# Patient Record
Sex: MALE | Race: BLACK OR AFRICAN AMERICAN | NOT HISPANIC OR LATINO | Employment: OTHER | URBAN - METROPOLITAN AREA
[De-identification: names, ages, dates, MRNs, and addresses within clinical notes are randomized per-mention and may not be internally consistent; named-entity substitution may affect disease eponyms.]

---

## 2024-01-09 ENCOUNTER — OFFICE VISIT (OUTPATIENT)
Dept: FAMILY MEDICINE CLINIC | Facility: CLINIC | Age: 79
End: 2024-01-09
Payer: COMMERCIAL

## 2024-01-09 VITALS
WEIGHT: 214.4 LBS | SYSTOLIC BLOOD PRESSURE: 126 MMHG | BODY MASS INDEX: 31.76 KG/M2 | DIASTOLIC BLOOD PRESSURE: 64 MMHG | HEIGHT: 69 IN | HEART RATE: 66 BPM | OXYGEN SATURATION: 94 %

## 2024-01-09 DIAGNOSIS — I10 HYPERTENSION, UNSPECIFIED TYPE: ICD-10-CM

## 2024-01-09 DIAGNOSIS — N50.89 SCROTAL SWELLING: ICD-10-CM

## 2024-01-09 DIAGNOSIS — N43.3 HYDROCELE, UNSPECIFIED HYDROCELE TYPE: Primary | ICD-10-CM

## 2024-01-09 PROCEDURE — 1160F RVW MEDS BY RX/DR IN RCRD: CPT | Performed by: INTERNAL MEDICINE

## 2024-01-09 PROCEDURE — 99204 OFFICE O/P NEW MOD 45 MIN: CPT | Performed by: INTERNAL MEDICINE

## 2024-01-09 PROCEDURE — 1159F MED LIST DOCD IN RCRD: CPT | Performed by: INTERNAL MEDICINE

## 2024-01-10 NOTE — PROGRESS NOTES
Chief Complaint   Patient presents with    testicle issue     Pt states testicles are collecting fluid.          HPI:  Slick Gomez is a 78 y.o. male who presents today for [facility] care, recently moved from [place].  History of fluid accumulation in testicles, recently drained a few years back.  Nonpainful.  Worsening over the last month.    ROS:  Constitutional: no fevers, night sweats or unexplained weight loss  Eyes: no vision changes  ENT: no runny nose, ear pain, sore throat  Cardio: no chest pain, palpitations  Pulm: no shortness of breath, wheezing, or cough  GI: no abdominal pain or changes in bowel movements  : no difficulty urinating  MSK: no difficulty ambulating, no joint pain  Neuro: no weakness, dizziness or headache  Psych: no trouble sleeping  Endo: no change in appetite      History reviewed. No pertinent past medical history.   History reviewed. No pertinent family history.   Social History     Socioeconomic History    Marital status:    Tobacco Use    Smoking status: Former     Types: Cigarettes     Quit date:      Years since quittin.0    Smokeless tobacco: Never   Vaping Use    Vaping Use: Never used   Substance and Sexual Activity    Alcohol use: Never    Drug use: Never    Sexual activity: Defer      Allergies   Allergen Reactions    Penicillins Hives        There is no immunization history on file for this patient.     PE:  Vitals:    24 1446   BP: 126/64   Pulse: 66   SpO2: 94%      Body mass index is 31.66 kg/m².    Gen Appearance: NAD  HEENT: Normocephalic, PERRLA, no thyromegaly, trache midline  Heart: RRR, normal S1 and S2, no murmur  Lungs: CTA b/l, no wheezing, no crackles  Abdomen: Soft, non-tender, non-distended, no guarding and BSx4  MSK: Moves all extremities well, normal gait, no peripheral edema  Pulses: Palpable and equal b/l  Lymph nodes: No palpable lymphadenopathy   Neuro: No focal deficits      No current outpatient medications on file.     No  current facility-administered medications for this visit.      Suspect hydrocele, refer to urology to establish care in [place].  Currently on blood pressure medication but he does not know the name of it.  Will check his prescriptions when he returns home.  Recommend follow-up in 1 month for annual physical and blood work.    Counseling was given to patient and family for the following topics: impressions, risks and benefits of treatment options, and importance of treatment compliance . Total time of the encounter was 45 minutes and 26 minutes was spent face to face counseling.    Diagnoses and all orders for this visit:    1. Hydrocele, unspecified hydrocele type (Primary)    2. Scrotal swelling    3. Hypertension, unspecified type         Return in about 4 weeks (around 2/6/2024) for Medicare Wellness.     Dictated Utilizing Dragon Dictation    Please note that portions of this note were completed with a voice recognition program.    Part of this note may be an electronic transcription/translation of spoken language to printed text using the Dragon Dictation System.

## 2024-01-12 ENCOUNTER — TELEPHONE (OUTPATIENT)
Dept: FAMILY MEDICINE CLINIC | Facility: CLINIC | Age: 79
End: 2024-01-12
Payer: COMMERCIAL

## 2024-01-12 DIAGNOSIS — N50.89 SCROTAL SWELLING: ICD-10-CM

## 2024-01-12 DIAGNOSIS — N43.3 HYDROCELE, UNSPECIFIED HYDROCELE TYPE: Primary | ICD-10-CM

## 2024-01-12 NOTE — TELEPHONE ENCOUNTER
Caller: Slick Gomez    Relationship: Self    Best call back number: [phone]     What is the medical concern/diagnosis: FLUID IN TESTICLE    What specialty or service is being requested: UROLOGIST        Any additional details: PATIENT HAS DECIDED TO GO ON AND GET SURGERY INSTEAD OF DRAINING THE FLUID AGAIN. NEED A REFERRAL TO A UROLOGIST. CALLBACK TO DISCUSS

## 2024-01-15 DIAGNOSIS — N50.89 SCROTAL SWELLING: ICD-10-CM

## 2024-01-15 DIAGNOSIS — N43.3 HYDROCELE, UNSPECIFIED HYDROCELE TYPE: Primary | ICD-10-CM

## 2024-01-30 ENCOUNTER — TELEPHONE (OUTPATIENT)
Dept: FAMILY MEDICINE CLINIC | Facility: CLINIC | Age: 79
End: 2024-01-30
Payer: COMMERCIAL

## 2024-01-30 NOTE — TELEPHONE ENCOUNTER
Clayton Vogel, DO [address] Clinical Pool; Cici Navarro RegSched RepJust now (12:54 PM)       Urology declined to see him unless they had US result. So will need to do US first then follow up with urology. We could see if another urology office will see/schedule him without the US if he doesn't want to wait?

## 2024-01-30 NOTE — TELEPHONE ENCOUNTER
Left message for Slick Gomez  to return my call.    Hub may relay message and document      Urology declined to see him unless they had US result. So will need to do US first then follow up with urology. We could see if another urology office will see/schedule him without the US if he doesn't want to wait?

## 2024-01-30 NOTE — TELEPHONE ENCOUNTER
Caller: TIM CUEVAS    Relationship: Spouse    Best call back number: [phone]     What is the best time to reach you: ANYTIME    Who are you requesting to speak with (clinical staff, provider,  specific staff member): PCP/MA    Do you know the name of the person who called: TIM    What was the call regarding: WIFE OF PATIENT STATED THE PATIENT WANTS TO GET SURGERY NOW INSTEAD OF JUST TAKING WATER OUT. CALLBACK TO DISCUSS    Is it okay if the provider responds through MyChart: CALLBACK TODAY PLEASE

## 2024-01-30 NOTE — TELEPHONE ENCOUNTER
Name: TIM CUEVAS    Relationship: Spouse    Best Callback Number:  [phone] (home)       HUB PROVIDED THE RELAY MESSAGE FROM THE OFFICE   PATIENT HAS FURTHER QUESTIONS AND WOULD LIKE A CALL BACK AT THE FOLLOWING PHONE NUMBER [phone]    ADDITIONAL INFORMATION: PATIENT'S WIFE TIM IS REQUESTING A CALL BACK   SHE DOES NOT KNOW WHAT US  MEANS     PLEASE CALL

## 2024-01-31 NOTE — TELEPHONE ENCOUNTER
Miesha Sawyer RegSched Rep11 minutes ago (10:45 AM)     JERICA  Caller: TIM CUEVAS     Relationship: Spouse     Best call back number: [phone]      What is the best time to reach you: ANYTIME     Who are you requesting to speak with (clinical staff, provider,  specific staff member): DR. ZELAYA     Do you know the name of the person who called: TIM     What was the call regarding: PATIENT WIFE IS CALLING TO REQUEST A CALL BACK FROM DR. ZELAYA TO DISCUSS CONCERNS FOR THE  SURGERY ABOUT THE PROSTATE        Is it okay if the provider responds through MyChart: NO PLEASE CALL ASAP

## 2024-01-31 NOTE — TELEPHONE ENCOUNTER
Unable to reach Slick Gomez by phone or leave message.     Hub may relay message and document.     US=Ultrasound      Per Dr Vogel will need to complete ultrasound first before urology will see patient       Please get any further questions or concerns in more detail

## 2024-01-31 NOTE — TELEPHONE ENCOUNTER
Unable to reach Slick Gomez by phone or leave message.    Hub may relay message and document.    US=Ultrasound     Per Dr Vogel will need to complete ultrasound first before urology will see patient

## 2024-02-01 NOTE — TELEPHONE ENCOUNTER
Name: TIM CUEVAS      Relationship: Spouse      Best Callback Number: [phone]      HUB PROVIDED THE RELAY MESSAGE FROM THE OFFICE      PATIENT: VOICED UNDERSTANDING AND HAS NO FURTHER QUESTIONS AT THIS TIME    ADDITIONAL INFORMATION:

## 2024-02-03 NOTE — TELEPHONE ENCOUNTER
Pt's wife called back regarding surgery to remove fluid. Relayed message and let her know he has been scheduled for a US on 2/20 at 3:30pm. Appt reminder has been placed in mail.

## 2024-02-13 ENCOUNTER — OFFICE VISIT (OUTPATIENT)
Dept: FAMILY MEDICINE CLINIC | Facility: CLINIC | Age: 79
End: 2024-02-13
Payer: COMMERCIAL

## 2024-02-13 VITALS
HEIGHT: 69 IN | OXYGEN SATURATION: 96 % | WEIGHT: 220 LBS | BODY MASS INDEX: 32.58 KG/M2 | DIASTOLIC BLOOD PRESSURE: 78 MMHG | HEART RATE: 59 BPM | SYSTOLIC BLOOD PRESSURE: 140 MMHG

## 2024-02-13 DIAGNOSIS — R41.3 MEMORY LOSS: ICD-10-CM

## 2024-02-13 DIAGNOSIS — N43.3 HYDROCELE, UNSPECIFIED HYDROCELE TYPE: ICD-10-CM

## 2024-02-13 DIAGNOSIS — I10 PRIMARY HYPERTENSION: ICD-10-CM

## 2024-02-13 DIAGNOSIS — Z00.00 MEDICARE ANNUAL WELLNESS VISIT, SUBSEQUENT: Primary | ICD-10-CM

## 2024-02-13 PROCEDURE — 1160F RVW MEDS BY RX/DR IN RCRD: CPT | Performed by: INTERNAL MEDICINE

## 2024-02-13 PROCEDURE — 1170F FXNL STATUS ASSESSED: CPT | Performed by: INTERNAL MEDICINE

## 2024-02-13 PROCEDURE — G0439 PPPS, SUBSEQ VISIT: HCPCS | Performed by: INTERNAL MEDICINE

## 2024-02-13 PROCEDURE — 99397 PER PM REEVAL EST PAT 65+ YR: CPT | Performed by: INTERNAL MEDICINE

## 2024-02-13 PROCEDURE — 1159F MED LIST DOCD IN RCRD: CPT | Performed by: INTERNAL MEDICINE

## 2024-02-13 RX ORDER — TRIAMCINOLONE ACETONIDE 1 MG/G
CREAM TOPICAL
COMMUNITY
Start: 2023-10-25

## 2024-02-20 ENCOUNTER — HOSPITAL ENCOUNTER (OUTPATIENT)
Dept: ULTRASOUND IMAGING | Facility: HOSPITAL | Age: 79
Discharge: HOME | End: 2024-02-20
Admitting: INTERNAL MEDICINE
Payer: COMMERCIAL

## 2024-02-20 DIAGNOSIS — N50.89 SCROTAL SWELLING: ICD-10-CM

## 2024-02-20 DIAGNOSIS — N43.3 HYDROCELE, UNSPECIFIED HYDROCELE TYPE: ICD-10-CM

## 2024-02-20 PROCEDURE — 76870 US EXAM SCROTUM: CPT

## 2024-02-21 DIAGNOSIS — N43.3 HYDROCELE, UNSPECIFIED HYDROCELE TYPE: Primary | ICD-10-CM

## 2024-03-19 ENCOUNTER — TELEPHONE (OUTPATIENT)
Dept: FAMILY MEDICINE CLINIC | Facility: CLINIC | Age: 79
End: 2024-03-19
Payer: COMMERCIAL

## 2024-03-19 ENCOUNTER — PATIENT ROUNDING (BHMG ONLY) (OUTPATIENT)
Dept: UROLOGY | Facility: CLINIC | Age: 79
End: 2024-03-19
Payer: COMMERCIAL

## 2024-03-19 ENCOUNTER — OFFICE VISIT (OUTPATIENT)
Dept: UROLOGY | Facility: CLINIC | Age: 79
End: 2024-03-19
Payer: COMMERCIAL

## 2024-03-19 VITALS — HEIGHT: 69 IN | WEIGHT: 216 LBS | OXYGEN SATURATION: 96 % | BODY MASS INDEX: 31.99 KG/M2 | HEART RATE: 60 BPM

## 2024-03-19 DIAGNOSIS — N43.3 HYDROCELE, UNSPECIFIED HYDROCELE TYPE: Primary | ICD-10-CM

## 2024-03-19 RX ORDER — SCOLOPAMINE TRANSDERMAL SYSTEM 1 MG/1
1 PATCH, EXTENDED RELEASE TRANSDERMAL CONTINUOUS
OUTPATIENT
Start: 2024-03-19 | End: 2024-03-22

## 2024-03-19 RX ORDER — ACETAMINOPHEN 500 MG
1000 TABLET ORAL ONCE
OUTPATIENT
Start: 2024-03-19 | End: 2024-03-19

## 2024-03-19 RX ORDER — GABAPENTIN 100 MG/1
600 CAPSULE ORAL ONCE
OUTPATIENT
Start: 2024-03-19 | End: 2024-03-19

## 2024-03-19 NOTE — PROGRESS NOTES
Office Visit [facility]      Patient Name: Slick Gomez  : 1945   MRN: [id number]     Chief Complaint:    Chief Complaint   Patient presents with    Hydrocele       Referring Provider: Clayton Vogel, *    History of Present Illness: Slick Gomez is a 78 y.o. male who presents to Urology today for evaluation of bilateral scrotal swelling.  Patient is accompanied by his family who aids in his history.  They report longstanding history of hydrocele.  He reports previously seeing a urologist and undergoing aspiration in 10/2023.  He has had quick recurrence of fluid.  Today he reports increasing size, associated bother with size and discomfort.  He denies baseline lower urinary tract symptoms.  Denies dysuria or hematuria.      Subjective      Review of System: Review of Systems   Genitourinary:  Positive for scrotal swelling. Negative for decreased urine volume, difficulty urinating, dysuria, enuresis, flank pain, frequency, hematuria and urgency.      I have reviewed the ROS documented by my clinical staff, updated appropriately and I agree. Christian Mendez MD    Past Medical History: History reviewed. No pertinent past medical history.    Past Surgical History: History reviewed. No pertinent surgical history.    Family History: History reviewed. No pertinent family history.    Social History:   Social History     Socioeconomic History    Marital status:    Tobacco Use    Smoking status: Former     Current packs/day: 0.00     Types: Cigarettes     Quit date:      Years since quittin.2     Passive exposure: Past    Smokeless tobacco: Never   Vaping Use    Vaping status: Never Used   Substance and Sexual Activity    Alcohol use: Never    Drug use: Never    Sexual activity: Defer       Medications:     Current Outpatient Medications:     acetaminophen (TYLENOL) 325 MG tablet, Take 2 tablets by mouth Every 6 (Six) Hours As Needed for Mild Pain., Disp: , Rfl:     aspirin (aspirin  EC) 81 MG EC tablet, Take 1 tablet by mouth Daily., Disp: , Rfl:     Calcium Carb-Cholecalciferol (Oyster Shell Calcium/D) 500-10 MG-MCG tablet tablet, Take  by mouth Daily., Disp: , Rfl:     desmopressin (DDAVP) 0.2 MG tablet, Take 1 tablet by mouth Daily., Disp: , Rfl:     donepezil (ARICEPT) 10 MG tablet, Take 1 tablet by mouth Every Night., Disp: , Rfl:     escitalopram (LEXAPRO) 10 MG tablet, Take 1 tablet by mouth Daily., Disp: , Rfl:     finasteride (PROSCAR) 5 MG tablet, Take 1 tablet by mouth Daily., Disp: , Rfl:     lisinopril (PRINIVIL,ZESTRIL) 40 MG tablet, Take 1 tablet by mouth Daily., Disp: , Rfl:     memantine (NAMENDA) 5 MG tablet, Take 1 tablet by mouth 2 (Two) Times a Day., Disp: , Rfl:     NIFEdipine XL (PROCARDIA XL) 60 MG 24 hr tablet, Take 1 tablet by mouth Daily., Disp: , Rfl:     Omega-3 Fatty Acids (fish oil) 1000 MG capsule capsule, Take  by mouth Daily With Breakfast., Disp: , Rfl:     risperiDONE (risperDAL) 0.25 MG tablet, Take 1 tablet by mouth 2 (Two) Times a Day., Disp: , Rfl:     tamsulosin (FLOMAX) 0.4 MG capsule 24 hr capsule, Take 1 capsule by mouth Daily., Disp: , Rfl:     triamcinolone (KENALOG) 0.1 % cream, APPLY TO ARMS TWICE A DAY AS NEEDED, Disp: , Rfl:     Allergies:   Allergies   Allergen Reactions    Penicillins Hives       IPSS Questionnaire (AUA-7):  Over the past month…    1)  Incomplete Emptying  How often have you had a sensation of not emptying your bladder?  5 - Almost always   2)  Frequency  How often have you had to urinate less than every two hours? 5 - Almost always   3)  Intermittency  How often have you found you stopped and started again several times when you urinated?  0 - Not at all   4) Urgency  How often have you found it difficult to postpone urination?  0 - Not at all   5) Weak Stream  How often have you had a weak urinary stream?  5 - Almost always   6) Straining  How often have you had to push or strain to begin urination?  0 - Not at all   7)  "Nocturia  How many times did you typically get up at night to urinate?  2 - 2 times   Total Score:  17       Quality of life due to urinary symptoms:  If you were to spend the rest of your life with your urinary condition the way it is now, how would you feel about that? 5-Unhappy   Urine Leakage (Incontinence) 0-No Leakage         Objective     Physical Exam:   Vital Signs:   Vitals:    03/19/24 0920   Pulse: 60   SpO2: 96%   Weight: 98 kg (216 lb)   Height: 175.3 cm (69.02\")   PainSc: 0-No pain     Body mass index is 31.88 kg/m².     Physical Exam  Vitals and nursing note reviewed.   Constitutional:       Appearance: Normal appearance.   HENT:      Head: Normocephalic and atraumatic.   Cardiovascular:      Comments: Well perfused  Pulmonary:      Effort: Pulmonary effort is normal.   Abdominal:      General: Abdomen is flat.      Palpations: Abdomen is soft.   Musculoskeletal:         General: Normal range of motion.   Skin:     General: Skin is warm and dry.   Neurological:      General: No focal deficit present.      Mental Status: He is alert and oriented to person, place, and time. Mental status is at baseline.   Psychiatric:         Mood and Affect: Mood normal.         Behavior: Behavior normal.         Thought Content: Thought content normal.         Judgment: Judgment normal.         Genitourinary  Penis: circumcised penis, glans normal, no penile discharge.  No rashes/lesions.    Testes: Large bilateral hydrocele remainder of scrotal contents normal. No hernia appreciated.      Labs:   Brief Urine Lab Results       None                 No results found for: \"GLUCOSE\", \"CALCIUM\", \"NA\", \"K\", \"CO2\", \"CL\", \"BUN\", \"CREATININE\", \"EGFRIFAFRI\", \"EGFRIFNONA\", \"BCR\", \"ANIONGAP\"    No results found for: \"WBC\", \"HGB\", \"HCT\", \"MCV\", \"PLT\"    Images:   US Scrotum & Testicles    Result Date: 2/21/2024  Impression: 1.Large bilateral hydroceles. Electronically Signed: Navneet Mazariegos MD  2/21/2024 4:06 PM EST  Workstation " ID: [id number]      Measures:   Tobacco:   Slick Gomez  reports that he quit smoking about 12 years ago. His smoking use included cigarettes. He has been exposed to tobacco smoke. He has never used smokeless tobacco. I have educated him on the risk of diseases from using tobacco products.   Assessment / Plan      Assessment/Plan:   78 y.o. male is here today for evaluation of large bilateral hydrocele.  He has had scrotal ultrasound which confirms diagnosis.  No additional pathology noted.  He has had prior aspiration with urologist in [facility] in 10/2023.  He reports return of fluid, increasing symptoms in size.  He reports significant bother.  Today we have discussed management including surveillance versus intervention.  Given associated bother the patient reports that he desires intervention.  We have discussed the risks and benefits of hydrocelectomy including infection, bleeding, swelling and bruising, recurrence of hydrocele.  He is understanding.  Given his bother symptoms he would like to proceed.  We will coordinate and schedule pending patient in OR availability.    Diagnoses and all orders for this visit:    1. Hydrocele, unspecified hydrocele type (Primary)  -     acetaminophen (TYLENOL) tablet 1,000 mg  -     gabapentin (NEURONTIN) capsule 600 mg  -     scopolamine patch 1 mg/72 hr  -     Case Request; Standing  -     ceFAZolin (ANCEF) 2,000 mg in sodium chloride 0.9 % 100 mL IVPB  -     Case Request    Other orders  -     Follow Anesthesia Guidelines / Protocol; Future  -     Provide NPO Instructions to Patient; Future  -     Provide Hydration Instructions to Patient; Future  -     Provide Patient With Enhanced Recovery Booklet(s) or Handout; Future  -     Provide Chlorhexidine Skin Prep Wipes and Instructions; Future  -     Obtain Informed Consent; Future  -     Nursing to Order Blood Glucose on all Inpatients >18 years Old with not BMP Ordered; Future  -     Nursing to Place Order for  HgbA1c on Adult Patients >18 Years Old (HgbA1c Within One Month of Admission Acceptable); Future  -     Follow Anesthesia Guidelines / Protocol; Standing  -     Provide Patient With Enhanced Recovery Booklet(s) OR Handout; Standing  -     Verify NPO Status; Standing  -     Verify the Time Patient Completed Gatorade / G2; Standing         I spent approximately 45 minutes providing clinical care for this patient; including review of patient's chart and provider documentation, face to face time spent with patient in examination room (obtaining history, performing physical exam, discussing diagnosis and management options), placing orders, and completing patient documentation.     Christian Mendez MD  [facility] [place]

## 2024-03-19 NOTE — PROGRESS NOTES
A [employer] message has been sent to the patient for PATIENT ROUNDING with [facility].

## 2024-03-19 NOTE — TELEPHONE ENCOUNTER
Caller: KODY ROBERTS    Relationship to patient: Other    Best call back number: [phone]     Patient is needing: KODY ROBERTS WITH [facility] CALLED TO ADVISE THE THE PATIENT'S [employer] MEDICARE COVERAGE IS REQUIRING A REFERRAL TO BE SENT TO THEM FOR AUTHORIZATION.    SHE STATED THAT IN ORDER FOR HIS VISIT TODAY TO BE COVERED, DR ZELAYA WILL NEED TO SUBMIT A REFERRAL TO THE PATIENT'S INSURANCE.    PLEASE ADVISE PATIENT WHEN THIS HAS BEEN FACILITATED OR IF THERE ARE ANY QUESTIONS/CONCERNS, PLEASE REACH OUT TO KODY ROBERTS.

## 2024-03-19 NOTE — TELEPHONE ENCOUNTER
CALLED AND SPOKE WITH A  LADY AND SHE STATED THAT KODY ROBERTS IS ON THE PHONE BUT SHE WILL BE GIVING ME A CALLBACK

## 2024-03-21 ENCOUNTER — TELEPHONE (OUTPATIENT)
Dept: FAMILY MEDICINE CLINIC | Facility: CLINIC | Age: 79
End: 2024-03-21
Payer: COMMERCIAL

## 2024-03-21 NOTE — TELEPHONE ENCOUNTER
Caller: CHARLEY CUEVAS    Relationship: WIFE    Best call back number: [phone]     What was the call regarding: CALLING IN ABOUT PATIENT, STATING HIS BLOOD NEEDS TO BE CHANGED. UNABLE TO PROVIDE MORE DETAILS. PLEASE ADVISE.     Is it okay if the provider responds through MyChart: NO

## 2024-03-21 NOTE — TELEPHONE ENCOUNTER
CALLED AND SPOKE WITH PTS WIFE REGARDING THE PHONE CALL AND SHE STATED SHE DOESN'T KNOW WHAT SHE MEANT BY HIS BLOOD NEEDS TO BE CHANGED SHE SEEMED TO BE CONFUSED SO SHE STATED THAT SHE WILL CALL BACK WHEN SHE CAN REMEMBER.

## 2024-04-15 ENCOUNTER — PRE-ADMISSION TESTING (OUTPATIENT)
Dept: PREADMISSION TESTING | Facility: HOSPITAL | Age: 79
End: 2024-04-15
Payer: COMMERCIAL

## 2024-04-15 VITALS — BODY MASS INDEX: 30.71 KG/M2 | HEIGHT: 70 IN | WEIGHT: 214.51 LBS

## 2024-04-15 LAB
DEPRECATED RDW RBC AUTO: 49.5 FL (ref 37–54)
ERYTHROCYTE [DISTWIDTH] IN BLOOD BY AUTOMATED COUNT: 15.1 % (ref 12.3–15.4)
GLUCOSE SERPL-MCNC: 102 MG/DL (ref 65–99)
HBA1C MFR BLD: 4.9 % (ref 4.8–5.6)
HCT VFR BLD AUTO: 38.8 % (ref 37.5–51)
HGB BLD-MCNC: 12.4 G/DL (ref 13–17.7)
MCH RBC QN AUTO: 28.6 PG (ref 26.6–33)
MCHC RBC AUTO-ENTMCNC: 32 G/DL (ref 31.5–35.7)
MCV RBC AUTO: 89.6 FL (ref 79–97)
PLATELET # BLD AUTO: 204 10*3/MM3 (ref 140–450)
PMV BLD AUTO: 9.9 FL (ref 6–12)
POTASSIUM SERPL-SCNC: 5 MMOL/L (ref 3.5–5.2)
RBC # BLD AUTO: 4.33 10*6/MM3 (ref 4.14–5.8)
WBC NRBC COR # BLD AUTO: 8.74 10*3/MM3 (ref 3.4–10.8)

## 2024-04-15 PROCEDURE — 82947 ASSAY GLUCOSE BLOOD QUANT: CPT

## 2024-04-15 PROCEDURE — 36415 COLL VENOUS BLD VENIPUNCTURE: CPT

## 2024-04-15 PROCEDURE — 83036 HEMOGLOBIN GLYCOSYLATED A1C: CPT

## 2024-04-15 PROCEDURE — 85027 COMPLETE CBC AUTOMATED: CPT

## 2024-04-15 PROCEDURE — 84132 ASSAY OF SERUM POTASSIUM: CPT

## 2024-04-15 PROCEDURE — 93005 ELECTROCARDIOGRAM TRACING: CPT

## 2024-04-15 NOTE — PAT
Per Anesthesia Request, patient instructed not to take their ACE/ARB medications on the AM of surgery.    Patient to apply Chlorhexadine wipes  to surgical area (as instructed) the night before procedure and the AM of procedure. Wipes provided.    Patient instructed to remove all jewelry for procedure.  Patient was instructed that if unable to remove jewelry especially rings to request assistance from a jeweler. Explained that if the piece of jewelry could not be removed before arrival to preop that it will be cut off.  Reinforced with patient that all jewelry must be removed for safety reasons that taping a ring was not an option.  Patient verbalized understanding.    Patient instructed to drink 20 ounces of Gatorade or Gatorlyte (if diabetic) and it needs to be completed 1 hour (for Main OR patients) or 2 hours (scheduled  section & [facility] patients) before given arrival time for procedure (NO RED Gatorade and NO Gatorade Zero).    Patient verbalized understanding.

## 2024-04-18 LAB
QT INTERVAL: 398 MS
QTC INTERVAL: 420 MS

## 2024-04-21 ENCOUNTER — ANESTHESIA EVENT (OUTPATIENT)
Dept: PERIOP | Facility: HOSPITAL | Age: 79
End: 2024-04-21
Payer: COMMERCIAL

## 2024-04-21 RX ORDER — SODIUM CHLORIDE 0.9 % (FLUSH) 0.9 %
10 SYRINGE (ML) INJECTION EVERY 12 HOURS SCHEDULED
Status: CANCELLED | OUTPATIENT
Start: 2024-04-21

## 2024-04-21 RX ORDER — SODIUM CHLORIDE 9 MG/ML
40 INJECTION, SOLUTION INTRAVENOUS AS NEEDED
Status: CANCELLED | OUTPATIENT
Start: 2024-04-21

## 2024-04-21 RX ORDER — SODIUM CHLORIDE 0.9 % (FLUSH) 0.9 %
10 SYRINGE (ML) INJECTION AS NEEDED
Status: CANCELLED | OUTPATIENT
Start: 2024-04-21

## 2024-04-21 RX ORDER — FAMOTIDINE 10 MG/ML
20 INJECTION, SOLUTION INTRAVENOUS ONCE
Status: CANCELLED | OUTPATIENT
Start: 2024-04-21 | End: 2024-04-21

## 2024-04-22 ENCOUNTER — HOSPITAL ENCOUNTER (OUTPATIENT)
Facility: HOSPITAL | Age: 79
Setting detail: HOSPITAL OUTPATIENT SURGERY
Discharge: HOME | End: 2024-04-22
Attending: UROLOGY | Admitting: UROLOGY
Payer: COMMERCIAL

## 2024-04-22 ENCOUNTER — ANESTHESIA (OUTPATIENT)
Dept: PERIOP | Facility: HOSPITAL | Age: 79
End: 2024-04-22
Payer: COMMERCIAL

## 2024-04-22 VITALS
DIASTOLIC BLOOD PRESSURE: 71 MMHG | HEART RATE: 68 BPM | BODY MASS INDEX: 31.77 KG/M2 | OXYGEN SATURATION: 95 % | RESPIRATION RATE: 17 BRPM | WEIGHT: 214.51 LBS | TEMPERATURE: 97.5 F | SYSTOLIC BLOOD PRESSURE: 165 MMHG | HEIGHT: 69 IN

## 2024-04-22 DIAGNOSIS — N43.3 HYDROCELE, UNSPECIFIED HYDROCELE TYPE: ICD-10-CM

## 2024-04-22 LAB — GLUCOSE BLDC GLUCOMTR-MCNC: 81 MG/DL (ref 70–130)

## 2024-04-22 PROCEDURE — 25810000003 LACTATED RINGERS PER 1000 ML: Performed by: ANESTHESIOLOGY

## 2024-04-22 PROCEDURE — 25010000002 SUGAMMADEX 200 MG/2ML SOLUTION: Performed by: NURSE ANESTHETIST, CERTIFIED REGISTERED

## 2024-04-22 PROCEDURE — 25010000002 FENTANYL CITRATE (PF) 100 MCG/2ML SOLUTION: Performed by: NURSE ANESTHETIST, CERTIFIED REGISTERED

## 2024-04-22 PROCEDURE — 88302 TISSUE EXAM BY PATHOLOGIST: CPT | Performed by: UROLOGY

## 2024-04-22 PROCEDURE — 54512 EXCISE LESION TESTIS: CPT | Performed by: UROLOGY

## 2024-04-22 PROCEDURE — 55041 REMOVAL OF HYDROCELES: CPT | Performed by: UROLOGY

## 2024-04-22 PROCEDURE — 25010000002 CEFAZOLIN PER 500 MG: Performed by: UROLOGY

## 2024-04-22 PROCEDURE — 54830 REMOVE EPIDIDYMIS LESION: CPT | Performed by: UROLOGY

## 2024-04-22 PROCEDURE — 25010000002 LIDOCAINE 1 % SOLUTION: Performed by: UROLOGY

## 2024-04-22 PROCEDURE — 25010000002 ONDANSETRON PER 1 MG: Performed by: NURSE ANESTHETIST, CERTIFIED REGISTERED

## 2024-04-22 PROCEDURE — 25010000002 PROPOFOL 10 MG/ML EMULSION: Performed by: NURSE ANESTHETIST, CERTIFIED REGISTERED

## 2024-04-22 PROCEDURE — 82948 REAGENT STRIP/BLOOD GLUCOSE: CPT

## 2024-04-22 RX ORDER — LIDOCAINE HYDROCHLORIDE 10 MG/ML
0.5 INJECTION, SOLUTION EPIDURAL; INFILTRATION; INTRACAUDAL; PERINEURAL ONCE AS NEEDED
Status: COMPLETED | OUTPATIENT
Start: 2024-04-22 | End: 2024-04-22

## 2024-04-22 RX ORDER — ETOMIDATE 2 MG/ML
INJECTION INTRAVENOUS AS NEEDED
Status: DISCONTINUED | OUTPATIENT
Start: 2024-04-22 | End: 2024-04-22 | Stop reason: SURG

## 2024-04-22 RX ORDER — LIDOCAINE HYDROCHLORIDE 10 MG/ML
INJECTION, SOLUTION INFILTRATION; PERINEURAL AS NEEDED
Status: DISCONTINUED | OUTPATIENT
Start: 2024-04-22 | End: 2024-04-22 | Stop reason: HOSPADM

## 2024-04-22 RX ORDER — ROCURONIUM BROMIDE 10 MG/ML
INJECTION, SOLUTION INTRAVENOUS AS NEEDED
Status: DISCONTINUED | OUTPATIENT
Start: 2024-04-22 | End: 2024-04-22 | Stop reason: SURG

## 2024-04-22 RX ORDER — FENTANYL CITRATE 50 UG/ML
INJECTION, SOLUTION INTRAMUSCULAR; INTRAVENOUS AS NEEDED
Status: DISCONTINUED | OUTPATIENT
Start: 2024-04-22 | End: 2024-04-22

## 2024-04-22 RX ORDER — ONDANSETRON 2 MG/ML
INJECTION INTRAMUSCULAR; INTRAVENOUS AS NEEDED
Status: DISCONTINUED | OUTPATIENT
Start: 2024-04-22 | End: 2024-04-22 | Stop reason: SURG

## 2024-04-22 RX ORDER — MIDAZOLAM HYDROCHLORIDE 1 MG/ML
0.5 INJECTION INTRAMUSCULAR; INTRAVENOUS
Status: DISCONTINUED | OUTPATIENT
Start: 2024-04-22 | End: 2024-04-22 | Stop reason: HOSPADM

## 2024-04-22 RX ORDER — SODIUM CHLORIDE, SODIUM LACTATE, POTASSIUM CHLORIDE, CALCIUM CHLORIDE 600; 310; 30; 20 MG/100ML; MG/100ML; MG/100ML; MG/100ML
9 INJECTION, SOLUTION INTRAVENOUS CONTINUOUS
Status: DISCONTINUED | OUTPATIENT
Start: 2024-04-22 | End: 2024-04-22 | Stop reason: HOSPADM

## 2024-04-22 RX ORDER — DROPERIDOL 2.5 MG/ML
0.62 INJECTION, SOLUTION INTRAMUSCULAR; INTRAVENOUS ONCE AS NEEDED
Status: DISCONTINUED | OUTPATIENT
Start: 2024-04-22 | End: 2024-04-22 | Stop reason: HOSPADM

## 2024-04-22 RX ORDER — SCOLOPAMINE TRANSDERMAL SYSTEM 1 MG/1
1 PATCH, EXTENDED RELEASE TRANSDERMAL CONTINUOUS
Status: DISCONTINUED | OUTPATIENT
Start: 2024-04-22 | End: 2024-04-22 | Stop reason: HOSPADM

## 2024-04-22 RX ORDER — FENTANYL CITRATE 50 UG/ML
50 INJECTION, SOLUTION INTRAMUSCULAR; INTRAVENOUS
Status: DISCONTINUED | OUTPATIENT
Start: 2024-04-22 | End: 2024-04-22 | Stop reason: HOSPADM

## 2024-04-22 RX ORDER — ACETAMINOPHEN 500 MG
1000 TABLET ORAL ONCE
Status: COMPLETED | OUTPATIENT
Start: 2024-04-22 | End: 2024-04-22

## 2024-04-22 RX ORDER — PROPOFOL 10 MG/ML
VIAL (ML) INTRAVENOUS AS NEEDED
Status: DISCONTINUED | OUTPATIENT
Start: 2024-04-22 | End: 2024-04-22 | Stop reason: SURG

## 2024-04-22 RX ORDER — MAGNESIUM HYDROXIDE 1200 MG/15ML
LIQUID ORAL AS NEEDED
Status: DISCONTINUED | OUTPATIENT
Start: 2024-04-22 | End: 2024-04-22 | Stop reason: HOSPADM

## 2024-04-22 RX ORDER — FAMOTIDINE 20 MG/1
20 TABLET, FILM COATED ORAL ONCE
Status: COMPLETED | OUTPATIENT
Start: 2024-04-22 | End: 2024-04-22

## 2024-04-22 RX ORDER — GABAPENTIN 300 MG/1
600 CAPSULE ORAL ONCE
Status: COMPLETED | OUTPATIENT
Start: 2024-04-22 | End: 2024-04-22

## 2024-04-22 RX ORDER — LIDOCAINE HYDROCHLORIDE 10 MG/ML
INJECTION, SOLUTION EPIDURAL; INFILTRATION; INTRACAUDAL; PERINEURAL AS NEEDED
Status: DISCONTINUED | OUTPATIENT
Start: 2024-04-22 | End: 2024-04-22 | Stop reason: SURG

## 2024-04-22 RX ADMIN — ACETAMINOPHEN 1000 MG: 500 TABLET ORAL at 07:04

## 2024-04-22 RX ADMIN — LIDOCAINE HYDROCHLORIDE 50 MG: 10 INJECTION, SOLUTION EPIDURAL; INFILTRATION; INTRACAUDAL; PERINEURAL at 07:41

## 2024-04-22 RX ADMIN — SODIUM CHLORIDE 2 G: 900 INJECTION INTRAVENOUS at 07:28

## 2024-04-22 RX ADMIN — ETOMIDATE 10 MG: 2 INJECTION, SOLUTION INTRAVENOUS at 07:42

## 2024-04-22 RX ADMIN — ROCURONIUM BROMIDE 50 MG: 10 INJECTION INTRAVENOUS at 07:43

## 2024-04-22 RX ADMIN — PROPOFOL 50 MG: 10 INJECTION, EMULSION INTRAVENOUS at 07:42

## 2024-04-22 RX ADMIN — FENTANYL CITRATE 50 MCG: 50 INJECTION, SOLUTION INTRAMUSCULAR; INTRAVENOUS at 07:39

## 2024-04-22 RX ADMIN — SODIUM CHLORIDE, POTASSIUM CHLORIDE, SODIUM LACTATE AND CALCIUM CHLORIDE 9 ML/HR: 600; 310; 30; 20 INJECTION, SOLUTION INTRAVENOUS at 07:05

## 2024-04-22 RX ADMIN — SUGAMMADEX 200 MG: 100 INJECTION, SOLUTION INTRAVENOUS at 08:58

## 2024-04-22 RX ADMIN — ONDANSETRON 4 MG: 2 INJECTION INTRAMUSCULAR; INTRAVENOUS at 08:08

## 2024-04-22 RX ADMIN — PROPOFOL 100 MG: 10 INJECTION, EMULSION INTRAVENOUS at 07:47

## 2024-04-22 RX ADMIN — GABAPENTIN 600 MG: 300 CAPSULE ORAL at 07:04

## 2024-04-22 RX ADMIN — LIDOCAINE HYDROCHLORIDE 0.5 ML: 10 INJECTION, SOLUTION EPIDURAL; INFILTRATION; INTRACAUDAL; PERINEURAL at 07:04

## 2024-04-22 RX ADMIN — FAMOTIDINE 20 MG: 20 TABLET, FILM COATED ORAL at 07:05

## 2024-04-22 RX ADMIN — FENTANYL CITRATE 50 MCG: 50 INJECTION, SOLUTION INTRAMUSCULAR; INTRAVENOUS at 08:08

## 2024-04-22 RX ADMIN — SUGAMMADEX 100 MG: 100 INJECTION, SOLUTION INTRAVENOUS at 09:02

## 2024-04-22 NOTE — ANESTHESIA PREPROCEDURE EVALUATION
Anesthesia Evaluation     Patient summary reviewed and Nursing notes reviewed   no history of anesthetic complications:   NPO Solid Status: > 8 hours  NPO Liquid Status: > 2 hours           Airway   Mallampati: II  TM distance: >3 FB  Neck ROM: full  Dental - normal exam     Pulmonary    (+) a smoker Former,  Cardiovascular     ECG reviewed    (+) hypertension  (-) dysrhythmias, angina, orthopnea, cardiac stents      Neuro/Psych  (+) dementia  (-) seizures, CVA    ROS Comment: H/o remote brain aneurysm  GI/Hepatic/Renal/Endo      Musculoskeletal     Abdominal    Substance History      OB/GYN          Other   arthritis,                       Anesthesia Plan    ASA 3     general     intravenous induction     Anesthetic plan, risks, benefits, and alternatives have been provided, discussed and informed consent has been obtained with: patient.    Plan discussed with CRNA.        CODE STATUS:

## 2024-04-22 NOTE — OP NOTE
HYDROCELECTOMY  Procedure Report    Patient Name:  Slick Gomez  YOB: 1945    Date of Surgery:  4/22/2024     Indications: 78-year-old male with bilateral large hydrocele.  The patient reports significant bother associated.  He would like to proceed with bilateral hydrocelectomy.  Risk benefits and alternatives to procedure, bilateral procedure were discussed and he elects to proceed    Pre-op Diagnosis:   Hydrocele, unspecified hydrocele type [N43.3]       Post-Op Diagnosis Codes:     * Hydrocele, unspecified hydrocele type [N43.3]    Procedure/CPT® Codes: 02640, 34399, 84408 (MODIFIER 50)      Procedure(s)  LEFT HYDROCELECTOMY  EXCISION OF LEFT APPENDIX TESTIS  EXCISION OF LEFT APPENDIX EPIDIDYMIS  RIGHT HYDROCELECTOMY  EXCISION OF RIGHT APPENDIX TESTIS   EXCISION OF RIGHT APPENDIX EPIDIDYMIS    MODIFIER 50- BILATERAL/INDEPENDENT PROCEDURE       Staff:  Surgeon(s):  Christian Mendez MD         Anesthesia: General    Estimated Blood Loss: minimal    Implants:    Nothing was implanted during the procedure    Specimen:          Specimens       ID Source Type Tests Collected By Collected At Frozen?    A Testis, Left Tissue TISSUE PATHOLOGY EXAM   Christian Mendez MD 4/22/24 0810     Description: left hydrocele sac    B Testis, Right Tissue TISSUE PATHOLOGY EXAM   Christian Mendez MD 4/22/24 0811     Description: Right hydrocele sac            Findings:   Left hydrocelectomy, approximately 300 mL of fluid removed.   Removal of left testicular appendage, epididymal appendage.  Right hydrocelectomy, approximately 175 mL of fluid removed.   Removal of right testicular appendage, epididymal appendage.    Complications: None immediate    Description of Procedure:     The patient was identified in the preoperative holding area where informed consent was reviewed and signed. He was transported the operating room per anesthesia and placed supine on the operating table.      Smooth endotracheal intubation  was performed without issue after administration of general anesthesia. The patient was kept in the supine position and genitals were prepped and draped in the usual sterile fashion. A brief timeout was performed identifying the correct patient procedure and laterality. Perioperative antibiotics were administered. All pressure points were padded.      4-6 cm incision was made along the left anterior hemiscrotum. Bovie cautery was used for hemostasis at the level of dartos tissue.  With the aid of the assistant, the dartos layers were carefully incised exposing the left hydrocele sac.  A hemostat clamp was used to dissected off the superficial layers overlying the hydrocele sac with the aid of the assistant of the Bovie cautery.  Eventually the entire hydrocele sac was cleared of the attached dartos tissue.      The testicle was identified within the hydrocele sac and avoided.  Bovie cautery was used to create a 1 cm incision, suction was used to remove hydrocele fluid.  Hydrocele fluid was straw color and clear yellow.  Next the hydrocele sac was incised longitudinally towards the spermatic cord and inferiorly towards the tail of the epididymis. The testicle was then delivered from within the hydrocele sac, the edges of the hydrocele sac were inverted.          Prominent appendix testis was identified on the superior pole of the testicle.  This was excised using 15 blade scalpel.  Hemostasis achieved.     Prominent appendix epididymis was identified on the epididymal head.  This was excised using 15 blade scalpel.  Hemostasis achieved.        Next a the hydrocele sac was excised with the Bovie cautery, taking care to avoid injury to the epididymis and spermatic cord itself.  The edges of the excised hydrocele sac were then cauterized with the Bovie cautery.  Via Jeboulay technique, the opposing edges of the remaining hydrocele sac were then sutured together with a running horizontal mattress 3-0 Vicryl stitch.           The testicular lie was then confirmed in the appropriate position and the testicle was returned to the hemiscrotal space after irrigation with sterile saline.  4 mL of Tisseel was applied to the area within the scrotum.  Hemostasis was checked at the right hemiscrotum and small bleeding dartos vessels were cauterized with the Bovie.          The left hemiscrotal space was then closed in 2 separate layers with a running 3-0 Vicryl stitch.  The skin was then closed with a running horizontal mattress 4-0 chromic stitch.     Then turned our attention to the right side, bilateral side.    4-6 cm incision was made along the right anterior hemiscrotum. Bovie cautery was used for hemostasis at the level of dartos tissue.  With the aid of the assistant, the dartos layers were carefully incised exposing the right hydrocele sac.  A hemostat clamp was used to dissected off the superficial layers overlying the hydrocele sac with the aid of the assistant of the Bovie cautery.  Eventually the entire hydrocele sac was cleared of the attached dartos tissue.      The testicle was identified within the hydrocele sac and avoided.  Bovie cautery was used to create a 1 cm incision, suction was used to remove hydrocele fluid.  Hydrocele fluid was straw color and clear yellow.  Next the hydrocele sac was incised longitudinally towards the spermatic cord and inferiorly towards the tail of the epididymis. The testicle was then delivered from within the hydrocele sac, the edges of the hydrocele sac were inverted.          Prominent appendix testis was identified on the superior pole of the testicle.  This was excised using 15 blade scalpel.  Hemostasis achieved.     Prominent appendix epididymis was identified on the epididymal head.  This was excised using 15 blade scalpel.  Hemostasis achieved.        Next a the hydrocele sac was excised with the Bovie cautery, taking care to avoid injury to the epididymis and spermatic cord itself.   The edges of the excised hydrocele sac were then cauterized with the Bovie cautery.  Via Jeboulay technique, the opposing edges of the remaining hydrocele sac were then sutured together with a running horizontal mattress 3-0 Vicryl stitch.          The testicular lie was then confirmed in the appropriate position and the testicle was returned to the hemiscrotal space after irrigation with sterile saline.  4 mL of Tisseel was applied to the area within the scrotum.  Hemostasis was checked at the right hemiscrotum and small bleeding dartos vessels were cauterized with the Bovie.          The right hemiscrotal space was then closed in 2 separate layers with a running 3-0 Vicryl stitch.  The skin was then closed with a running horizontal mattress 4-0 chromic stitch.        The patient was awoken from general anesthesia and transported to the PACU in stable condition.          Disposition/Follow Up: Patient will be discharged remaining appropriate PACU anterior.  Patient will follow-up in 2 to 3 weeks for postoperative wound check.    Christian Mendez MD     Date: 4/22/2024  Time: 09:21 EDT

## 2024-04-22 NOTE — DISCHARGE INSTRUCTIONS
DISCHARGE INSTRUCTIONS - HYDROCELECTOMY    ER WARNINGS  When to call the [facility] at: [phone]   If you feel the need to urinate, but are unable to   Excessive drainage/bleeding from your incision   If your incision becomes red, warm, and/or you notice pus draining from the incision   Fever greater than 100.5F; excessive chills   Nausea/vomiting or inability to keep fluids down   Significant tenderness or swelling in the legs, chest, and/or shortness of breath   If it is a serious emergency, go directly to the Emergency Room or call 911, and then notify the [facility]    IF YOU HAVE ANY QUESTIONS, CALL THE [facility] at [phone]. IF YOU ARE CALLING AFTER HOURS, PLEASE CALL THE HOSPITAL MAINLINE TO TALK WITH THE ON-CALL DOCTOR    SCROTAL SUPPORT   For the next 2 weeks wear supportive tighter underwear or an athletic supporter to keep compression on the scrotum. This, and using ice packs on and off 20 minutes at a time for the next 72 hours will greatly reduce the swelling post-operatively.    Your scrotum may experience some bruising, but call if it is swelling more and turning completely purple. In the mean time you should apply pressure to the scrotum to stop the expanding hematoma (blood clot) if it starts.    Your scrotum and the testicle in question is expected to be somewhat swollen for the next 4-6 weeks.     PAIN CONTROL   Some pain and discomfort is common after surgery. Initially, your goal should be comfort, not complete pain relief.    If permitted by your overall medical condition, begin by taking over the counter pain and inflammation relievers:   o Tylenol (acetaminophen): 650mg every 6 hours   o Ibuprofen (Advil, Motrin): 600mg every 8 hours with food    o Only take narcotic medications if absolutely needed for comfort after the other medications have been taken   o Use of narcotic pain medications should be minimized due to the  potential for side effects and long term complications    ACTIVITY   Make no important decisions for 24 hours.    Do not drive or operate equipment for 24 hours or while taking pain medications.    No lifting more than 5 pounds for 2 weeks.    INCISION    Your incision is closed with absorbable suture, such that you do not need to have staples or stitches removed in the clinic.    You may shower tomorrow. Just do not scrub or brush over those areas until they are healed. Do not submerge the incision under water (for example in a pool, bathtub, etc.) until it is fully healed, usually about 3 weeks.   Call for any redness, secretions, or openings you may observe in your incision.     Dr. Mendez's Office [phone]      Dr. Mendez's Office will call you with follow-up

## 2024-04-22 NOTE — ANESTHESIA POSTPROCEDURE EVALUATION
Patient: Slick Gomez    Procedure Summary       Date: 04/22/24 Room / Location:  [facility] /  [facility]    Anesthesia Start: 0730 Anesthesia Stop:     Procedure: HYDROCELECTOMY (Bilateral: Scrotum) Diagnosis:       Hydrocele, unspecified hydrocele type      (Hydrocele, unspecified hydrocele type [N43.3])    Surgeons: Christian Mendez MD Provider: Gene Roman Jr., MD    Anesthesia Type: general ASA Status: 3            Anesthesia Type: general    Vitals  Vitals Value Taken Time   /80 04/22/24 0911   Temp     Pulse 68 04/22/24 0914   Resp     SpO2 93 % 04/22/24 0914   Vitals shown include unfiled device data.        Post Anesthesia Care and Evaluation    Patient location during evaluation: PACU  Patient participation: complete - patient participated  Pain management: adequate    Airway patency: patent  Anesthetic complications: No anesthetic complications  PONV Status: none  Cardiovascular status: hemodynamically stable and acceptable  Respiratory status: nonlabored ventilation, acceptable and face mask  Hydration status: acceptable

## 2024-04-22 NOTE — ANESTHESIA PROCEDURE NOTES
Airway  Urgency: elective    Date/Time: 4/22/2024 7:45 AM  Airway not difficult    General Information and Staff    Patient location during procedure: OR    Indications and Patient Condition  Indications for airway management: airway protection    Preoxygenated: yes  MILS not maintained throughout  Mask difficulty assessment: 3 - difficult mask (inadequate, unstable or two providers) +/- NMBA    Final Airway Details  Final airway type: endotracheal airway      Successful airway: ETT  Cuffed: yes   Successful intubation technique: video laryngoscopy  Facilitating devices/methods: intubating stylet  Endotracheal tube insertion site: oral  Blade: Montoya  Blade size: 4  ETT size (mm): 7.5  Cormack-Lehane Classification: grade I - full view of glottis  Placement verified by: chest auscultation and capnometry   Inital cuff pressure (cm H2O): 7  Measured from: lips  ETT/EBT  to lips (cm): 23  Number of attempts at approach: 1  Assessment: lips, teeth, and gum same as pre-op and atraumatic intubation    Additional Comments  Negative epigastric sounds, Breath sound equal bilaterally with symmetric chest rise and fall

## 2024-04-22 NOTE — H&P
Pre-Op H&P  Slick Gomez  [id number]  1945    Chief complaint: scrotal swelling    HPI:    Patient is a 78 y.o.male who presents with longstanding bilateral scrotal swelling. This has been an issue for several years- he reports an aspiration by a physician in [place] in 10/2023 with quick return. He is scheduled for bilateral hydrocelectomy. He takes 81mg ASA routinely, last dose was 2-3 days ago.     Review of Systems:  General ROS: negative for chills, fever or skin lesions.  Cardiovascular ROS: no chest pain or dyspnea on exertion  Respiratory ROS: no cough, shortness of breath, or wheezing    Allergies:   Allergies   Allergen Reactions    Penicillins Hives       Home Meds:    No current facility-administered medications on file prior to encounter.     Current Outpatient Medications on File Prior to Encounter   Medication Sig Dispense Refill    acetaminophen (TYLENOL) 325 MG tablet Take 2 tablets by mouth Every 6 (Six) Hours As Needed for Mild Pain.      aspirin (aspirin EC) 81 MG EC tablet Take 1 tablet by mouth Daily.      Calcium Carb-Cholecalciferol (Oyster Shell Calcium/D) 500-10 MG-MCG tablet tablet Take  by mouth Daily.      desmopressin (DDAVP) 0.2 MG tablet Take 1 tablet by mouth Daily.      donepezil (ARICEPT) 10 MG tablet Take 1 tablet by mouth Every Night.      escitalopram (LEXAPRO) 10 MG tablet Take 1 tablet by mouth Daily.      finasteride (PROSCAR) 5 MG tablet Take 1 tablet by mouth Daily.      lisinopril (PRINIVIL,ZESTRIL) 40 MG tablet Take 1 tablet by mouth Daily.      memantine (NAMENDA) 5 MG tablet Take 1 tablet by mouth 2 (Two) Times a Day.      NIFEdipine XL (PROCARDIA XL) 60 MG 24 hr tablet Take 1 tablet by mouth Daily.      Omega-3 Fatty Acids (fish oil) 1000 MG capsule capsule Take  by mouth Daily With Breakfast.      risperiDONE (risperDAL) 0.25 MG tablet Take 1 tablet by mouth 2 (Two) Times a Day.      tamsulosin (FLOMAX) 0.4 MG capsule 24 hr capsule Take 1 capsule by  "mouth Daily.      triamcinolone (KENALOG) 0.1 % cream APPLY TO ARMS TWICE A DAY AS NEEDED         PMH:   Past Medical History:   Diagnosis Date    Aneurysm     head    Arthritis     Cataract     still present    Headache     Hypertension     Presence of dental bridge     x1- connecting 1 tooth-  dental bridge - bottom right side    Wears glasses      PSH:    Past Surgical History:   Procedure Laterality Date    CRANIOTOMY FOR ANEURYSM      HYDROCELECTOMY      drainage       Immunization History:  Influenza: UTD  Pneumococcal: UTD  Tetanus: UTD    Social History:   Tobacco:   Social History     Tobacco Use   Smoking Status Former    Current packs/day: 0.00    Types: Cigarettes    Quit date:     Years since quittin.3    Passive exposure: Past   Smokeless Tobacco Never      Alcohol:     Social History     Substance and Sexual Activity   Alcohol Use Never       Vitals:           /80 (BP Location: Right arm, Patient Position: Lying)   Pulse 62   Temp 97.8 °F (36.6 °C) (Temporal)   Resp 18   Ht 176.5 cm (69.49\")   Wt 97.3 kg (214 lb 8.1 oz)   SpO2 97%   BMI 31.23 kg/m²     Physical Exam:  General Appearance:    Alert, cooperative   Head:    Normocephalic, atraumatic   Lungs:     Clear to auscultation bilaterally, respirations unlabored    Heart:   Regular rate and rhythm, no murmur, rub or gallop    Abdomen:    Soft, nontender. No rigidity or guarding.    Breast Exam:    deferred   Genitalia:    deferred   Extremities:    No cyanosis    Skin:   Skin color, texture, turgor normal, no rashes or lesions   Neurologic:   Grossly intact   Results Review  LABS:  Lab Results   Component Value Date    WBC 8.74 04/15/2024    HGB 12.4 (L) 04/15/2024    HCT 38.8 04/15/2024    MCV 89.6 04/15/2024     04/15/2024    GLUCOSE 102 (H) 04/15/2024    K 5.0 04/15/2024   A1c: 4.90%    RADIOLOGY:  No radiology results for the last 3 days     I reviewed the patient's new imaging results and agree with the " interpretation.    Impression: bilateral hydrocele    Plan: bilateral hydrocelectomy     Jian Bansal PA-C   4/22/2024   06:58 EDT

## 2024-04-23 LAB
CYTO UR: NORMAL
LAB AP CASE REPORT: NORMAL
LAB AP CLINICAL INFORMATION: NORMAL
PATH REPORT.FINAL DX SPEC: NORMAL
PATH REPORT.GROSS SPEC: NORMAL

## 2024-05-04 ENCOUNTER — APPOINTMENT (OUTPATIENT)
Facility: HOSPITAL | Age: 79
End: 2024-05-04
Payer: COMMERCIAL

## 2024-05-04 ENCOUNTER — HOSPITAL ENCOUNTER (EMERGENCY)
Facility: HOSPITAL | Age: 79
Discharge: HOME | End: 2024-05-04
Attending: EMERGENCY MEDICINE
Payer: COMMERCIAL

## 2024-05-04 VITALS
HEART RATE: 66 BPM | OXYGEN SATURATION: 98 % | HEIGHT: 69 IN | WEIGHT: 214.51 LBS | DIASTOLIC BLOOD PRESSURE: 59 MMHG | RESPIRATION RATE: 18 BRPM | BODY MASS INDEX: 31.77 KG/M2 | SYSTOLIC BLOOD PRESSURE: 130 MMHG | TEMPERATURE: 98.3 F

## 2024-05-04 DIAGNOSIS — N43.3 HYDROCELE IN ADULT: ICD-10-CM

## 2024-05-04 DIAGNOSIS — T81.31XA WOUND DISRUPTION, POST-OP, SKIN, INITIAL ENCOUNTER: Primary | ICD-10-CM

## 2024-05-04 DIAGNOSIS — D64.89 ANEMIA DUE TO OTHER CAUSE, NOT CLASSIFIED: ICD-10-CM

## 2024-05-04 DIAGNOSIS — T81.49XA CELLULITIS, WOUND, POST-OPERATIVE: ICD-10-CM

## 2024-05-04 DIAGNOSIS — R79.89 ELEVATED SERUM CREATININE: ICD-10-CM

## 2024-05-04 LAB
ALBUMIN SERPL-MCNC: 3.2 G/DL (ref 3.5–5.2)
ALBUMIN/GLOB SERPL: 0.7 G/DL
ALP SERPL-CCNC: 84 U/L (ref 39–117)
ALT SERPL W P-5'-P-CCNC: 9 U/L (ref 1–41)
ANION GAP SERPL CALCULATED.3IONS-SCNC: 11.5 MMOL/L (ref 5–15)
AST SERPL-CCNC: 24 U/L (ref 1–40)
BACTERIA UR QL AUTO: NORMAL /HPF
BASOPHILS # BLD AUTO: 0.03 10*3/MM3 (ref 0–0.2)
BASOPHILS NFR BLD AUTO: 0.3 % (ref 0–1.5)
BILIRUB SERPL-MCNC: 0.3 MG/DL (ref 0–1.2)
BILIRUB UR QL STRIP: NEGATIVE
BUN SERPL-MCNC: 15 MG/DL (ref 8–23)
BUN/CREAT SERPL: 9.8 (ref 7–25)
CALCIUM SPEC-SCNC: 8.8 MG/DL (ref 8.6–10.5)
CHLORIDE SERPL-SCNC: 104 MMOL/L (ref 98–107)
CLARITY UR: CLEAR
CO2 SERPL-SCNC: 22.5 MMOL/L (ref 22–29)
COLOR UR: YELLOW
CREAT SERPL-MCNC: 1.53 MG/DL (ref 0.76–1.27)
D-LACTATE SERPL-SCNC: 2.1 MMOL/L (ref 0.5–2)
D-LACTATE SERPL-SCNC: 2.1 MMOL/L (ref 0.5–2)
DEPRECATED RDW RBC AUTO: 49.3 FL (ref 37–54)
EGFRCR SERPLBLD CKD-EPI 2021: 46.2 ML/MIN/1.73
EOSINOPHIL # BLD AUTO: 0.25 10*3/MM3 (ref 0–0.4)
EOSINOPHIL NFR BLD AUTO: 2.4 % (ref 0.3–6.2)
ERYTHROCYTE [DISTWIDTH] IN BLOOD BY AUTOMATED COUNT: 14.9 % (ref 12.3–15.4)
GLOBULIN UR ELPH-MCNC: 4.9 GM/DL
GLUCOSE SERPL-MCNC: 123 MG/DL (ref 65–99)
GLUCOSE UR STRIP-MCNC: NEGATIVE MG/DL
HCT VFR BLD AUTO: 29.5 % (ref 37.5–51)
HGB BLD-MCNC: 9.7 G/DL (ref 13–17.7)
HGB UR QL STRIP.AUTO: ABNORMAL
HYALINE CASTS UR QL AUTO: NORMAL /LPF
IMM GRANULOCYTES # BLD AUTO: 0.04 10*3/MM3 (ref 0–0.05)
IMM GRANULOCYTES NFR BLD AUTO: 0.4 % (ref 0–0.5)
INR PPP: 1.19 (ref 0.89–1.12)
KETONES UR QL STRIP: NEGATIVE
LEUKOCYTE ESTERASE UR QL STRIP.AUTO: NEGATIVE
LYMPHOCYTES # BLD AUTO: 1.68 10*3/MM3 (ref 0.7–3.1)
LYMPHOCYTES NFR BLD AUTO: 16.1 % (ref 19.6–45.3)
MCH RBC QN AUTO: 29.3 PG (ref 26.6–33)
MCHC RBC AUTO-ENTMCNC: 32.9 G/DL (ref 31.5–35.7)
MCV RBC AUTO: 89.1 FL (ref 79–97)
MONOCYTES # BLD AUTO: 0.83 10*3/MM3 (ref 0.1–0.9)
MONOCYTES NFR BLD AUTO: 7.9 % (ref 5–12)
NEUTROPHILS NFR BLD AUTO: 7.62 10*3/MM3 (ref 1.7–7)
NEUTROPHILS NFR BLD AUTO: 72.9 % (ref 42.7–76)
NITRITE UR QL STRIP: NEGATIVE
PH UR STRIP.AUTO: 7 [PH] (ref 5–8)
PLATELET # BLD AUTO: 345 10*3/MM3 (ref 140–450)
PMV BLD AUTO: 9.7 FL (ref 6–12)
POTASSIUM SERPL-SCNC: 4.4 MMOL/L (ref 3.5–5.2)
PROT SERPL-MCNC: 8.1 G/DL (ref 6–8.5)
PROT UR QL STRIP: NEGATIVE
PROTHROMBIN TIME: 15.7 SECONDS (ref 12.2–14.5)
RBC # BLD AUTO: 3.31 10*6/MM3 (ref 4.14–5.8)
RBC # UR STRIP: NORMAL /HPF
REF LAB TEST METHOD: NORMAL
SODIUM SERPL-SCNC: 138 MMOL/L (ref 136–145)
SP GR UR STRIP: 1.01 (ref 1–1.03)
SQUAMOUS #/AREA URNS HPF: NORMAL /HPF
TRANS CELLS #/AREA URNS HPF: NORMAL /HPF
UROBILINOGEN UR QL STRIP: ABNORMAL
WBC # UR STRIP: NORMAL /HPF
WBC NRBC COR # BLD AUTO: 10.45 10*3/MM3 (ref 3.4–10.8)

## 2024-05-04 PROCEDURE — 87147 CULTURE TYPE IMMUNOLOGIC: CPT | Performed by: PHYSICIAN ASSISTANT

## 2024-05-04 PROCEDURE — 87040 BLOOD CULTURE FOR BACTERIA: CPT | Performed by: PHYSICIAN ASSISTANT

## 2024-05-04 PROCEDURE — 87077 CULTURE AEROBIC IDENTIFY: CPT | Performed by: PHYSICIAN ASSISTANT

## 2024-05-04 PROCEDURE — 85025 COMPLETE CBC W/AUTO DIFF WBC: CPT | Performed by: PHYSICIAN ASSISTANT

## 2024-05-04 PROCEDURE — 85610 PROTHROMBIN TIME: CPT | Performed by: PHYSICIAN ASSISTANT

## 2024-05-04 PROCEDURE — 25010000002 VANCOMYCIN HCL IN NACL 2-0.9 GM/500ML-% SOLUTION: Performed by: PHYSICIAN ASSISTANT

## 2024-05-04 PROCEDURE — 87205 SMEAR GRAM STAIN: CPT | Performed by: PHYSICIAN ASSISTANT

## 2024-05-04 PROCEDURE — 80053 COMPREHEN METABOLIC PANEL: CPT | Performed by: PHYSICIAN ASSISTANT

## 2024-05-04 PROCEDURE — 25810000003 LACTATED RINGERS SOLUTION: Performed by: PHYSICIAN ASSISTANT

## 2024-05-04 PROCEDURE — 83605 ASSAY OF LACTIC ACID: CPT | Performed by: PHYSICIAN ASSISTANT

## 2024-05-04 PROCEDURE — 76870 US EXAM SCROTUM: CPT

## 2024-05-04 PROCEDURE — 87070 CULTURE OTHR SPECIMN AEROBIC: CPT | Performed by: PHYSICIAN ASSISTANT

## 2024-05-04 PROCEDURE — 25010000002 CEFEPIME PER 500 MG: Performed by: PHYSICIAN ASSISTANT

## 2024-05-04 PROCEDURE — 96366 THER/PROPH/DIAG IV INF ADDON: CPT

## 2024-05-04 PROCEDURE — 93976 VASCULAR STUDY: CPT

## 2024-05-04 PROCEDURE — 81001 URINALYSIS AUTO W/SCOPE: CPT | Performed by: PHYSICIAN ASSISTANT

## 2024-05-04 PROCEDURE — 87186 SC STD MICRODIL/AGAR DIL: CPT | Performed by: PHYSICIAN ASSISTANT

## 2024-05-04 PROCEDURE — 99284 EMERGENCY DEPT VISIT MOD MDM: CPT

## 2024-05-04 PROCEDURE — 36415 COLL VENOUS BLD VENIPUNCTURE: CPT

## 2024-05-04 PROCEDURE — 96365 THER/PROPH/DIAG IV INF INIT: CPT

## 2024-05-04 PROCEDURE — 96367 TX/PROPH/DG ADDL SEQ IV INF: CPT

## 2024-05-04 RX ORDER — VANCOMYCIN 2 GRAM/500 ML IN 0.9 % SODIUM CHLORIDE INTRAVENOUS
20 ONCE
Status: DISCONTINUED | OUTPATIENT
Start: 2024-05-04 | End: 2024-05-04

## 2024-05-04 RX ORDER — VANCOMYCIN 2 GRAM/500 ML IN 0.9 % SODIUM CHLORIDE INTRAVENOUS
20 ONCE
Status: COMPLETED | OUTPATIENT
Start: 2024-05-04 | End: 2024-05-04

## 2024-05-04 RX ORDER — LEVOFLOXACIN 500 MG/1
500 TABLET, FILM COATED ORAL DAILY
Qty: 7 TABLET | Refills: 0 | Status: SHIPPED | OUTPATIENT
Start: 2024-05-04 | End: 2024-05-11

## 2024-05-04 RX ORDER — LEVOFLOXACIN 500 MG/1
500 TABLET, FILM COATED ORAL DAILY
Qty: 7 TABLET | Refills: 0 | Status: SHIPPED | OUTPATIENT
Start: 2024-05-04 | End: 2024-05-04

## 2024-05-04 RX ADMIN — SODIUM CHLORIDE, POTASSIUM CHLORIDE, SODIUM LACTATE AND CALCIUM CHLORIDE 500 ML: 600; 310; 30; 20 INJECTION, SOLUTION INTRAVENOUS at 11:51

## 2024-05-04 RX ADMIN — Medication 2000 MG: at 13:14

## 2024-05-04 RX ADMIN — CEFEPIME 2 G: 2 INJECTION, POWDER, FOR SOLUTION INTRAVENOUS at 12:41

## 2024-05-04 RX ADMIN — SODIUM CHLORIDE, POTASSIUM CHLORIDE, SODIUM LACTATE AND CALCIUM CHLORIDE 500 ML: 600; 310; 30; 20 INJECTION, SOLUTION INTRAVENOUS at 13:14

## 2024-05-04 NOTE — DISCHARGE INSTRUCTIONS
You should be contacted by surgeon to have follow-up appointment on Monday next week, please keep this appointment and answer phone calls to schedule this.  If you do not hear from their office please call on Monday to get scheduled.  Keep wound clean and dry and wrapped with dressing until follow-up with surgeon.  You have been given prescription for Levaquin to take once a day for 7 days until completed.  Have a low threshold to return the emergency department for new, worsening, concerning symptoms new, worsening, concerning symptoms.

## 2024-05-04 NOTE — FSED PROVIDER NOTE
Subjective  History of Present Illness:    Patient is a 78-year-old male with a medical history of hypertension, dementia presents to the emergency department for postoperative wound check.  Patient had a bilateral hydrocelectomy on April 22 with [facility] surgeon .  Patient noticed drainage over the past 2 to 3 days prompting him to come the emergency department for evaluation.  Patient denies fever or systemic symptoms.  Patient denies heavy lifting or trauma to surgery site.  Patient is not taking antibiotics.  Patient denies urinary symptoms or pain.      Nurses Notes reviewed and agree, including vitals, allergies, social history and prior medical history.     REVIEW OF SYSTEMS: All systems reviewed and not pertinent unless noted.  Review of Systems   Constitutional:  Negative for chills and fever.   HENT:  Negative for ear pain, sore throat and trouble swallowing.    Eyes:  Negative for visual disturbance.   Respiratory:  Negative for cough and shortness of breath.    Cardiovascular:  Negative for chest pain, palpitations and leg swelling.   Gastrointestinal:  Negative for abdominal distention, abdominal pain, constipation, diarrhea, nausea and vomiting.   Endocrine: Negative for cold intolerance and heat intolerance.   Genitourinary:  Positive for scrotal swelling. Negative for difficulty urinating, dysuria, flank pain, frequency, genital sores, penile discharge, penile pain, penile swelling and urgency.        Drainage from left scrotal incision site   Musculoskeletal:  Negative for back pain, gait problem and neck pain.   Skin:  Negative for rash.   Allergic/Immunologic: Negative for immunocompromised state.   Neurological:  Negative for dizziness, syncope, weakness, light-headedness and numbness.   Hematological:  Does not bruise/bleed easily.   Psychiatric/Behavioral:  Negative for hallucinations. The patient is not nervous/anxious.    All other systems reviewed and are negative.      Past  "Medical History:   Diagnosis Date    Aneurysm     head    Arthritis     Cataract     still present    Headache     Hypertension     Presence of dental bridge     x1- connecting 1 tooth-  dental bridge - bottom right side    Wears glasses        Allergies:    Penicillins      Past Surgical History:   Procedure Laterality Date    CRANIOTOMY FOR ANEURYSM      HYDROCELECTOMY      drainage    HYDROCELECTOMY Bilateral 2024    Procedure: HYDROCELECTOMY BILATERAL;  Surgeon: Christian Mendez MD;  Location: [facility];  Service: Urology;  Laterality: Bilateral;         Social History     Socioeconomic History    Marital status:    Tobacco Use    Smoking status: Former     Current packs/day: 0.00     Types: Cigarettes     Quit date:      Years since quittin.3     Passive exposure: Past    Smokeless tobacco: Never   Vaping Use    Vaping status: Never Used   Substance and Sexual Activity    Alcohol use: Never    Drug use: Never    Sexual activity: Defer         No family history on file.    Objective  Physical Exam:  /68 (BP Location: Left arm, Patient Position: Sitting)   Pulse 77   Temp 98.3 °F (36.8 °C) (Oral)   Resp 18   Ht 176.5 cm (69.49\")   Wt 97.3 kg (214 lb 8.1 oz)   SpO2 95%   BMI 31.23 kg/m²      Physical Exam  Vitals and nursing note reviewed.   Constitutional:       General: He is not in acute distress.     Appearance: He is not toxic-appearing.   HENT:      Head: Normocephalic and atraumatic.      Right Ear: External ear normal.      Left Ear: External ear normal.      Nose: Nose normal.      Mouth/Throat:      Mouth: Mucous membranes are moist.   Eyes:      Extraocular Movements: Extraocular movements intact.      Conjunctiva/sclera: Conjunctivae normal.      Pupils: Pupils are equal, round, and reactive to light.   Cardiovascular:      Rate and Rhythm: Normal rate and regular rhythm.      Pulses: Normal pulses.      Heart sounds: Normal heart sounds. No murmur heard.  Pulmonary:    "   Effort: Pulmonary effort is normal.      Breath sounds: Normal breath sounds. No stridor. No wheezing or rhonchi.   Abdominal:      General: Bowel sounds are normal. There is no distension.      Palpations: Abdomen is soft.      Tenderness: There is no abdominal tenderness. There is no right CVA tenderness, left CVA tenderness, guarding or rebound.   Genitourinary:     Penis: Normal.       Comments: Chaperone Carmelo BECERRA. Edema bilaterally of scrotum with mild induration. No TTP. Wound dehiscence from left surgical site with mild drainage and odor.  Penile lesions or edema.  Musculoskeletal:         General: No deformity. Normal range of motion.      Cervical back: Normal range of motion and neck supple.      Right lower leg: No edema.      Left lower leg: No edema.   Skin:     General: Skin is warm and dry.      Capillary Refill: Capillary refill takes less than 2 seconds.      Findings: No erythema or rash.   Neurological:      General: No focal deficit present.      Mental Status: He is alert and oriented to person, place, and time.      Cranial Nerves: No cranial nerve deficit.      Sensory: No sensory deficit.      Coordination: Coordination normal.      Gait: Gait normal.   Psychiatric:         Mood and Affect: Mood normal.         Behavior: Behavior normal.         Procedures    ED Course:         Lab Results (last 24 hours)       ** No results found for the last 24 hours. **             No radiology results from the last 24 hrs       MDM     Amount and/or Complexity of Data Reviewed  Clinical lab tests: reviewed  Tests in the radiology section of CPT®: reviewed  Decide to obtain previous medical records or to obtain history from someone other than the patient: yes        Initial impression of presenting illness: Patient is 78-year-old male presents emergency department for postoperative wound check with draining from the left side of scrotum incision site.  Patient had surgery with Dr. Mendez on  4/22/2024.    DDX: includes but is not limited to: Infection, wound dehiscence, hematoma, others    Patient arrives afebrile, hemodynamic stable, nontoxic-appearing with vitals interpreted by myself.     Pertinent features from physical exam: There is wound dehiscence with foul odor and mild drainage left sided scrotal incision.  Edema and enlargement of the bilaterally scrotum with mild induration.  Patient denies TTP    Initial diagnostic plan: Workup includes CBC, CMP, lactic acid, blood cultures, wound culture, testicular ultrasound.    Results from initial plan were reviewed and interpreted by me revealing no evidence of leukocytosis or significant electrolyte abnormality.  Hemoglobin is down at 9.5 from 12, compared to 2 weeks ago.  Lactic acid is elevated at 2.1.  Patient given IV fluids as well as vancomycin and cefepime.  Urinalysis shows trace microscopic hematuria without evidence of UTI.  Ultrasound reveals:   1.Preserved blood flow within the bilateral testicles.  2.Large, complex/septated bilateral hydroceles. These could represent hematoceles, spermatoceles, or pyoceles. Please correlate clinically.  3.Bilateral scrotal skin thickening. Please correlate clinically for scrotal cellulitis.       Interventions / Re-evaluation: Discussed case with patient's surgeon, Dr. Mendez, and surgeon states that patient does not require admission at this time with benign vitals as it is common for patients of his age to have wound dehiscence after this procedure.  He would like patient on antibiotics and will have him follow-up on Monday for recheck.  Patient and family are agreeable with plan of care.  Wound is wrapped in the emergency department and patient is provided supplies for dressing change.  Patient will be sent home on Levaquin 500 mg once a day after discussing dosage with pharmacist.  Patient is instructed to follow-up with PCP for recheck of creatinine as we do not know baseline, as well as hemoglobin  levels.  Patient is given strict return precautions to the emergency department.    Medications - No data to display    Data interpreted: Nursing notes reviewed, vital signs reviewed.  Labs independently interpreted by me (CBC, CMP, lipase, UA, troponin, ABG, lactic acid, procalcitonin).  Imaging independently interpreted by me (x-ray, CT scan).  EKG independently interpreted by me.  O2 saturation:    Counseling: Discussed the results above with the patient regarding need for admission or discharge.  Patient understands and agrees plan of care.      -----  ED Disposition       None          Final diagnoses:   None     Your Follow-Up Providers    Follow-up information has not been specified.       Contact information for after-discharge care    Follow-up information has not been specified.          Your medication list        ASK your doctor about these medications        Instructions Last Dose Given Next Dose Due   acetaminophen 325 MG tablet  Commonly known as: TYLENOL      Take 2 tablets by mouth Every 6 (Six) Hours As Needed for Mild Pain.       aspirin EC 81 MG EC tablet  Generic drug: aspirin      Take 1 tablet by mouth Daily.       desmopressin 0.2 MG tablet  Commonly known as: DDAVP      Take 1 tablet by mouth Daily.       donepezil 10 MG tablet  Commonly known as: ARICEPT      Take 1 tablet by mouth Every Night.       escitalopram 10 MG tablet  Commonly known as: LEXAPRO      Take 1 tablet by mouth Daily.       finasteride 5 MG tablet  Commonly known as: PROSCAR      Take 1 tablet by mouth Daily.       fish oil 1000 MG capsule capsule      Take  by mouth Daily With Breakfast.       lisinopril 40 MG tablet  Commonly known as: PRINIVIL,ZESTRIL      Take 1 tablet by mouth Daily.       memantine 5 MG tablet  Commonly known as: NAMENDA      Take 1 tablet by mouth 2 (Two) Times a Day.       NIFEdipine XL 60 MG 24 hr tablet  Commonly known as: PROCARDIA XL      Take 1 tablet by mouth Daily.       Oyster Shell  Calcium/D 500-10 MG-MCG tablet tablet      Take  by mouth Daily.       risperiDONE 0.25 MG tablet  Commonly known as: risperDAL      Take 1 tablet by mouth 2 (Two) Times a Day.       tamsulosin 0.4 MG capsule 24 hr capsule  Commonly known as: FLOMAX      Take 1 capsule by mouth Daily.       triamcinolone 0.1 % cream  Commonly known as: KENALOG      APPLY TO ARMS TWICE A DAY AS NEEDED

## 2024-05-05 LAB
GRAM STN SPEC: NORMAL
GRAM STN SPEC: NORMAL

## 2024-05-06 ENCOUNTER — OFFICE VISIT (OUTPATIENT)
Dept: UROLOGY | Facility: CLINIC | Age: 79
End: 2024-05-06
Payer: COMMERCIAL

## 2024-05-06 VITALS — HEIGHT: 69 IN | BODY MASS INDEX: 31.7 KG/M2 | WEIGHT: 214 LBS

## 2024-05-06 DIAGNOSIS — N43.3 HYDROCELE, UNSPECIFIED HYDROCELE TYPE: Primary | ICD-10-CM

## 2024-05-07 ENCOUNTER — OFFICE VISIT (OUTPATIENT)
Dept: NEUROLOGY | Facility: CLINIC | Age: 79
End: 2024-05-07
Payer: COMMERCIAL

## 2024-05-07 VITALS
HEIGHT: 69 IN | SYSTOLIC BLOOD PRESSURE: 122 MMHG | BODY MASS INDEX: 30.87 KG/M2 | WEIGHT: 208.4 LBS | DIASTOLIC BLOOD PRESSURE: 50 MMHG | OXYGEN SATURATION: 97 % | HEART RATE: 80 BPM

## 2024-05-07 DIAGNOSIS — F03.90 DEMENTIA WITHOUT BEHAVIORAL DISTURBANCE: Primary | ICD-10-CM

## 2024-05-07 PROCEDURE — 99214 OFFICE O/P EST MOD 30 MIN: CPT | Performed by: NURSE PRACTITIONER

## 2024-05-07 RX ORDER — MEMANTINE HYDROCHLORIDE 10 MG/1
10 TABLET ORAL 2 TIMES DAILY
Qty: 60 TABLET | Refills: 11 | Status: SHIPPED | OUTPATIENT
Start: 2024-05-07

## 2024-05-07 RX ORDER — CETIRIZINE HYDROCHLORIDE 10 MG/1
10 TABLET ORAL DAILY
COMMUNITY

## 2024-05-08 LAB
BACTERIA SPEC AEROBE CULT: ABNORMAL
GRAM STN SPEC: ABNORMAL
GRAM STN SPEC: ABNORMAL

## 2024-05-08 RX ORDER — SULFAMETHOXAZOLE AND TRIMETHOPRIM 800; 160 MG/1; MG/1
2 TABLET ORAL 2 TIMES DAILY
Qty: 40 TABLET | Refills: 0 | Status: SHIPPED | OUTPATIENT
Start: 2024-05-08 | End: 2024-05-18

## 2024-05-10 LAB
BACTERIA SPEC AEROBE CULT: NORMAL
BACTERIA SPEC AEROBE CULT: NORMAL

## 2024-05-17 ENCOUNTER — OFFICE VISIT (OUTPATIENT)
Dept: UROLOGY | Facility: CLINIC | Age: 79
End: 2024-05-17
Payer: COMMERCIAL

## 2024-05-17 VITALS
WEIGHT: 208 LBS | SYSTOLIC BLOOD PRESSURE: 123 MMHG | OXYGEN SATURATION: 92 % | BODY MASS INDEX: 30.81 KG/M2 | HEART RATE: 97 BPM | HEIGHT: 69 IN | DIASTOLIC BLOOD PRESSURE: 67 MMHG

## 2024-05-17 DIAGNOSIS — N43.3 HYDROCELE, UNSPECIFIED HYDROCELE TYPE: Primary | ICD-10-CM

## 2024-05-17 DIAGNOSIS — T81.31XD POSTOPERATIVE WOUND DEHISCENCE, SUBSEQUENT ENCOUNTER: ICD-10-CM

## 2024-05-17 PROCEDURE — 99024 POSTOP FOLLOW-UP VISIT: CPT | Performed by: NURSE PRACTITIONER

## 2024-05-17 NOTE — PROGRESS NOTES
Follow Up Office Visit      Patient Name: Slick Gomez  : 1945   MRN: [id number]     Chief Complaint:    Chief Complaint   Patient presents with    Hydrocele, unspecified hydrocele type       Referring Provider: No ref. provider found    History of Present Illness: Slick Gomez is a 78 y.o. male who presents today for follow up of post-operative wound check. He is s/p Bilateral Hydrocelectomy on 24 with Dr. Mendez. He presents with his wife today who helps to provide some of the history. He was seen in office one week ago for incision site/wound check. At that time he was taking Levaquin 500mg and performing local wound care with gauze. Post-operative wound was discussed with Dr. Mendez at that time with plans to continue antibiotic and local wound care/no signs of infection at that time.     Today he is feeling well. He denies dysuria, scrotal pain, worsening scrotal swelling, fever or chills. He has completed course of Levaquin.   Subjective      Review of System: Review of Systems   Genitourinary:  Positive for scrotal swelling. Negative for dysuria and testicular pain.   All other systems reviewed and are negative.     I have reviewed the ROS documented by my clinical staff, I have updated appropriately and I agree. EDEN Plaza    I have reviewed and the following portions of the patient's history were updated as appropriate: past family history, past medical history, past social history, past surgical history and problem list.    Medications:     Current Outpatient Medications:     acetaminophen (TYLENOL) 325 MG tablet, Take 2 tablets by mouth Every 6 (Six) Hours As Needed for Mild Pain., Disp: , Rfl:     aspirin (aspirin EC) 81 MG EC tablet, Take 1 tablet by mouth Daily., Disp: , Rfl:     Calcium Carb-Cholecalciferol (Oyster Shell Calcium/D) 500-10 MG-MCG tablet tablet, Take  by mouth Daily., Disp: , Rfl:     cetirizine (zyrTEC) 10 MG tablet, Take 1 tablet by mouth  "Daily., Disp: , Rfl:     desmopressin (DDAVP) 0.2 MG tablet, Take 1 tablet by mouth Daily., Disp: , Rfl:     donepezil (ARICEPT) 10 MG tablet, Take 1 tablet by mouth Every Night., Disp: , Rfl:     escitalopram (LEXAPRO) 10 MG tablet, Take 1 tablet by mouth Daily., Disp: , Rfl:     finasteride (PROSCAR) 5 MG tablet, Take 1 tablet by mouth Daily., Disp: , Rfl:     memantine (Namenda) 10 MG tablet, Take 1 tablet by mouth 2 (Two) Times a Day., Disp: 60 tablet, Rfl: 11    NIFEdipine XL (PROCARDIA XL) 60 MG 24 hr tablet, Take 1 tablet by mouth Daily., Disp: , Rfl:     Omega-3 Fatty Acids (fish oil) 1000 MG capsule capsule, Take  by mouth 2 (Two) Times a Day With Meals., Disp: , Rfl:     risperiDONE (risperDAL) 0.25 MG tablet, Take 1 tablet by mouth 2 (Two) Times a Day., Disp: , Rfl:     tamsulosin (FLOMAX) 0.4 MG capsule 24 hr capsule, Take 1 capsule by mouth Daily., Disp: , Rfl:     ondansetron ODT (ZOFRAN-ODT) 4 MG disintegrating tablet, Take 1 tablet by mouth Every 6 (Six) Hours As Needed for Nausea or Vomiting., Disp: 30 tablet, Rfl: 0  No current facility-administered medications for this visit.    Allergies:   Allergies   Allergen Reactions    Penicillins Hives     Has tolerated Cefazolin in the past             Objective     Physical Exam:   Vital Signs:   Vitals:    05/17/24 1033   BP: 123/67   Pulse: 97   SpO2: 92%   Weight: 94.3 kg (208 lb)   Height: 175.3 cm (69\")     Body mass index is 30.72 kg/m².     Physical Exam  Vitals and nursing note reviewed.   Constitutional:       Appearance: Normal appearance.   HENT:      Head: Normocephalic and atraumatic.      Nose: Nose normal.      Mouth/Throat:      Mouth: Mucous membranes are moist.   Eyes:      Pupils: Pupils are equal, round, and reactive to light.   Pulmonary:      Effort: Pulmonary effort is normal.   Abdominal:      General: Abdomen is flat.      Palpations: Abdomen is soft.   Genitourinary:     Comments: Right hemiscrotal incision site is clean, dry, " intact. No evidence of incision site dehiscence. There is moderate right hemiscrotal swelling, normal post-operative swelling.     Left hemiscrotal incision site is dehisced < 0.5 inch. Wound depth has improved from office visit 1 week ago. There is mild serosanguinous drainage. No evidence of pus draining from incision site. There is no significant erythema. He is non tender to palpation.     Musculoskeletal:         General: Normal range of motion.      Cervical back: Normal range of motion.   Skin:     General: Skin is warm and dry.      Capillary Refill: Capillary refill takes less than 2 seconds.   Neurological:      General: No focal deficit present.      Mental Status: He is alert.   Psychiatric:         Mood and Affect: Mood normal.       Labs:   Brief Urine Lab Results  (Last result in the past 365 days)        Color   Clarity   Blood   Leuk Est   Nitrite   Protein   CREAT   Urine HCG        05/18/24 1530 Yellow   Clear   Negative   Negative   Negative   Negative                        Lab Results   Component Value Date    GLUCOSE 93 05/19/2024    CALCIUM 8.1 (L) 05/19/2024     05/19/2024    K 5.2 05/19/2024    CO2 21.0 (L) 05/19/2024     05/19/2024    BUN 26 (H) 05/19/2024    CREATININE 1.98 (H) 05/19/2024    BCR 13.1 05/19/2024    ANIONGAP 10.0 05/19/2024       Lab Results   Component Value Date    WBC 6.84 05/19/2024    HGB 9.2 (L) 05/19/2024    HCT 29.3 (L) 05/19/2024    MCV 91.8 05/19/2024     05/19/2024       Images:   US Testicular or Ovarian Vascular Limited    Result Date: 5/4/2024  1.Preserved blood flow within the bilateral testicles. 2.Large, complex/septated bilateral hydroceles. These could represent hematoceles, spermatoceles, or pyoceles. Please correlate clinically. 3.Bilateral scrotal skin thickening. Please correlate clinically for scrotal cellulitis. Electronically Signed: Daniel Cheng MD  5/4/2024 1:04 PM EDT  Workstation ID: [id number]    US Scrotum &  Testicles    Result Date: 5/4/2024  1.Preserved blood flow within the bilateral testicles. 2.Large, complex/septated bilateral hydroceles. These could represent hematoceles, spermatoceles, or pyoceles. Please correlate clinically. 3.Bilateral scrotal skin thickening. Please correlate clinically for scrotal cellulitis. Electronically Signed: Daniel Cheng MD  5/4/2024 1:04 PM EDT  Workstation ID: [id number]     Scrotum & Testicles    Result Date: 2/21/2024  Impression: 1.Large bilateral hydroceles. Electronically Signed: Navneet Mazariegos MD  2/21/2024 4:06 PM EST  Workstation ID: [id number]      Measures:   Tobacco:   Slick Gomez  reports that he quit smoking about 12 years ago. His smoking use included cigarettes. He has been exposed to tobacco smoke. He has never used smokeless tobacco. I         Urine Incontinence: Patient reports that he is not currently experiencing any symptoms of urinary incontinence.      Assessment / Plan      Assessment/Plan:   78 y.o. male who presented today for follow up of scrotal wound check.     Left hemiscrotal wound is improving. No evidence of pus, significant induration or erythema. He is NON tender to palpation of left hemiscrotum. He denies fever/chills. Encouraged to continue washing incision site daily with soap and water and continue local wound care with gauze. Additional gauze provided. Follow up in 2 weeks with Dr. Mendez for incision site check/post-op follow up.   Diagnoses and all orders for this visit:    1. Hydrocele, unspecified hydrocele type (Primary)    2. Postoperative wound dehiscence, subsequent encounter           Follow Up:   Return in about 2 weeks (around 5/31/2024).    I spent approximately 30 minutes providing clinical care for this patient; including review of patient's chart and provider documentation, face to face time spent with patient in examination room (obtaining history, performing physical exam, discussing diagnosis and management options),  placing orders, and completing patient documentation.     EDEN Plaza  [facility]

## 2024-05-18 ENCOUNTER — APPOINTMENT (OUTPATIENT)
Facility: HOSPITAL | Age: 79
End: 2024-05-18
Payer: COMMERCIAL

## 2024-05-18 ENCOUNTER — HOSPITAL ENCOUNTER (OUTPATIENT)
Facility: HOSPITAL | Age: 79
Discharge: HOME | End: 2024-05-19
Attending: EMERGENCY MEDICINE | Admitting: INTERNAL MEDICINE
Payer: COMMERCIAL

## 2024-05-18 DIAGNOSIS — D64.9 ANEMIA, UNSPECIFIED TYPE: ICD-10-CM

## 2024-05-18 DIAGNOSIS — R11.2 NAUSEA AND VOMITING, UNSPECIFIED VOMITING TYPE: ICD-10-CM

## 2024-05-18 DIAGNOSIS — R53.1 WEAKNESS: ICD-10-CM

## 2024-05-18 DIAGNOSIS — N17.9 ACUTE KIDNEY INJURY: Primary | ICD-10-CM

## 2024-05-18 PROBLEM — R79.89 ELEVATED SERUM CREATININE: Status: ACTIVE | Noted: 2024-05-18

## 2024-05-18 PROBLEM — N50.89 SCROTUM SWELLING: Status: ACTIVE | Noted: 2024-05-18

## 2024-05-18 LAB
ALBUMIN SERPL-MCNC: 3.4 G/DL (ref 3.5–5.2)
ALBUMIN/GLOB SERPL: 0.7 G/DL
ALP SERPL-CCNC: 106 U/L (ref 39–117)
ALT SERPL W P-5'-P-CCNC: 15 U/L (ref 1–41)
ANION GAP SERPL CALCULATED.3IONS-SCNC: 12.1 MMOL/L (ref 5–15)
ANION GAP SERPL CALCULATED.3IONS-SCNC: 7 MMOL/L (ref 5–15)
APTT PPP: 25.2 SECONDS (ref 60–90)
AST SERPL-CCNC: 34 U/L (ref 1–40)
BASOPHILS # BLD AUTO: 0.01 10*3/MM3 (ref 0–0.2)
BASOPHILS NFR BLD AUTO: 0.1 % (ref 0–1.5)
BILIRUB SERPL-MCNC: 0.5 MG/DL (ref 0–1.2)
BILIRUB UR QL STRIP: NEGATIVE
BUN BLDA-MCNC: 40 MG/DL (ref 8–26)
BUN SERPL-MCNC: 28 MG/DL (ref 8–23)
BUN SERPL-MCNC: 30 MG/DL (ref 8–23)
BUN/CREAT SERPL: 11.5 (ref 7–25)
BUN/CREAT SERPL: 11.7 (ref 7–25)
CA-I BLDA-SCNC: 1.13 MMOL/L (ref 4.5–5.3)
CALCIUM SPEC-SCNC: 8.9 MG/DL (ref 8.6–10.5)
CALCIUM SPEC-SCNC: 9.1 MG/DL (ref 8.6–10.5)
CHLORIDE BLDA-SCNC: 106 MMOL/L (ref 98–109)
CHLORIDE SERPL-SCNC: 102 MMOL/L (ref 98–107)
CHLORIDE SERPL-SCNC: 104 MMOL/L (ref 98–107)
CLARITY UR: CLEAR
CO2 BLDA-SCNC: 23 MMOL/L (ref 23–27)
CO2 SERPL-SCNC: 20.9 MMOL/L (ref 22–29)
CO2 SERPL-SCNC: 23 MMOL/L (ref 22–29)
COLOR UR: YELLOW
CREAT BLDA-MCNC: 3.1 MG/DL (ref 0.6–1.3)
CREAT SERPL-MCNC: 2.44 MG/DL (ref 0.76–1.27)
CREAT SERPL-MCNC: 2.57 MG/DL (ref 0.76–1.27)
D-LACTATE SERPL-SCNC: 1.6 MMOL/L (ref 0.5–2)
DEPRECATED RDW RBC AUTO: 50.3 FL (ref 37–54)
DEVELOPER EXPIRATION DATE: NORMAL
DEVELOPER LOT NUMBER: NORMAL
EGFRCR SERPLBLD CKD-EPI 2021: 19.8 ML/MIN/1.73
EGFRCR SERPLBLD CKD-EPI 2021: 24.8 ML/MIN/1.73
EGFRCR SERPLBLD CKD-EPI 2021: 26.4 ML/MIN/1.73
EOSINOPHIL # BLD AUTO: 0.02 10*3/MM3 (ref 0–0.4)
EOSINOPHIL NFR BLD AUTO: 0.3 % (ref 0.3–6.2)
ERYTHROCYTE [DISTWIDTH] IN BLOOD BY AUTOMATED COUNT: 15.7 % (ref 12.3–15.4)
EXPIRATION DATE: NORMAL
FECAL OCCULT BLOOD SCREEN, POC: NEGATIVE
FLUAV RNA RESP QL NAA+PROBE: NOT DETECTED
FLUBV RNA RESP QL NAA+PROBE: NOT DETECTED
GLOBULIN UR ELPH-MCNC: 4.9 GM/DL
GLUCOSE BLDC GLUCOMTR-MCNC: 95 MG/DL (ref 70–130)
GLUCOSE SERPL-MCNC: 94 MG/DL (ref 65–99)
GLUCOSE SERPL-MCNC: 95 MG/DL (ref 65–99)
GLUCOSE UR STRIP-MCNC: NEGATIVE MG/DL
HCT VFR BLD AUTO: 29.8 % (ref 37.5–51)
HCT VFR BLDA CALC: 32 % (ref 38–51)
HGB BLD-MCNC: 9.9 G/DL (ref 13–17.7)
HGB BLDA-MCNC: 10.9 G/DL (ref 12–17)
HGB UR QL STRIP.AUTO: NEGATIVE
HOLD SPECIMEN: NORMAL
IMM GRANULOCYTES # BLD AUTO: 0.01 10*3/MM3 (ref 0–0.05)
IMM GRANULOCYTES NFR BLD AUTO: 0.1 % (ref 0–0.5)
INR PPP: 1.16 (ref 0.89–1.12)
KETONES UR QL STRIP: NEGATIVE
LEUKOCYTE ESTERASE UR QL STRIP.AUTO: NEGATIVE
LYMPHOCYTES # BLD AUTO: 1.69 10*3/MM3 (ref 0.7–3.1)
LYMPHOCYTES NFR BLD AUTO: 23 % (ref 19.6–45.3)
Lab: NORMAL
MCH RBC QN AUTO: 28.9 PG (ref 26.6–33)
MCHC RBC AUTO-ENTMCNC: 33.2 G/DL (ref 31.5–35.7)
MCV RBC AUTO: 87.1 FL (ref 79–97)
MONOCYTES # BLD AUTO: 1.08 10*3/MM3 (ref 0.1–0.9)
MONOCYTES NFR BLD AUTO: 14.7 % (ref 5–12)
NEGATIVE CONTROL: NEGATIVE
NEUTROPHILS NFR BLD AUTO: 4.54 10*3/MM3 (ref 1.7–7)
NEUTROPHILS NFR BLD AUTO: 61.8 % (ref 42.7–76)
NITRITE UR QL STRIP: NEGATIVE
PH UR STRIP.AUTO: 7.5 [PH] (ref 5–8)
PLATELET # BLD AUTO: 244 10*3/MM3 (ref 140–450)
PMV BLD AUTO: 9.8 FL (ref 6–12)
POSITIVE CONTROL: POSITIVE
POTASSIUM BLDA-SCNC: 6.2 MMOL/L (ref 3.5–4.9)
POTASSIUM SERPL-SCNC: 5.2 MMOL/L (ref 3.5–5.2)
POTASSIUM SERPL-SCNC: 5.2 MMOL/L (ref 3.5–5.2)
PROT SERPL-MCNC: 8.3 G/DL (ref 6–8.5)
PROT UR QL STRIP: NEGATIVE
PROTHROMBIN TIME: 15.3 SECONDS (ref 12.2–14.5)
RBC # BLD AUTO: 3.42 10*6/MM3 (ref 4.14–5.8)
RSV RNA RESP QL NAA+PROBE: NOT DETECTED
SARS-COV-2 RNA RESP QL NAA+PROBE: NOT DETECTED
SODIUM BLD-SCNC: 136 MMOL/L (ref 138–146)
SODIUM SERPL-SCNC: 134 MMOL/L (ref 136–145)
SODIUM SERPL-SCNC: 135 MMOL/L (ref 136–145)
SP GR UR STRIP: 1.01 (ref 1–1.03)
TROPONIN T SERPL HS-MCNC: 15 NG/L
UROBILINOGEN UR QL STRIP: NORMAL
WBC NRBC COR # BLD AUTO: 7.35 10*3/MM3 (ref 3.4–10.8)
WHOLE BLOOD HOLD COAG: NORMAL
WHOLE BLOOD HOLD SPECIMEN: NORMAL

## 2024-05-18 PROCEDURE — G0378 HOSPITAL OBSERVATION PER HR: HCPCS

## 2024-05-18 PROCEDURE — 83605 ASSAY OF LACTIC ACID: CPT | Performed by: EMERGENCY MEDICINE

## 2024-05-18 PROCEDURE — 87205 SMEAR GRAM STAIN: CPT | Performed by: INTERNAL MEDICINE

## 2024-05-18 PROCEDURE — 87637 SARSCOV2&INF A&B&RSV AMP PRB: CPT | Performed by: PHYSICIAN ASSISTANT

## 2024-05-18 PROCEDURE — 85025 COMPLETE CBC W/AUTO DIFF WBC: CPT | Performed by: EMERGENCY MEDICINE

## 2024-05-18 PROCEDURE — 63710000001 ESCITALOPRAM 10 MG TABLET: Performed by: NURSE PRACTITIONER

## 2024-05-18 PROCEDURE — 96374 THER/PROPH/DIAG INJ IV PUSH: CPT

## 2024-05-18 PROCEDURE — 74176 CT ABD & PELVIS W/O CONTRAST: CPT

## 2024-05-18 PROCEDURE — A9270 NON-COVERED ITEM OR SERVICE: HCPCS | Performed by: NURSE PRACTITIONER

## 2024-05-18 PROCEDURE — 63710000001 NIFEDIPINE XL 30 MG TABLET SUSTAINED-RELEASE 24 HOUR: Performed by: NURSE PRACTITIONER

## 2024-05-18 PROCEDURE — 63710000001 FINASTERIDE 5 MG TABLET: Performed by: NURSE PRACTITIONER

## 2024-05-18 PROCEDURE — 85014 HEMATOCRIT: CPT

## 2024-05-18 PROCEDURE — 82270 OCCULT BLOOD FECES: CPT | Performed by: EMERGENCY MEDICINE

## 2024-05-18 PROCEDURE — 85730 THROMBOPLASTIN TIME PARTIAL: CPT | Performed by: PHYSICIAN ASSISTANT

## 2024-05-18 PROCEDURE — 74022 RADEX COMPL AQT ABD SERIES: CPT

## 2024-05-18 PROCEDURE — 80053 COMPREHEN METABOLIC PANEL: CPT | Performed by: EMERGENCY MEDICINE

## 2024-05-18 PROCEDURE — 93005 ELECTROCARDIOGRAM TRACING: CPT | Performed by: EMERGENCY MEDICINE

## 2024-05-18 PROCEDURE — 81003 URINALYSIS AUTO W/O SCOPE: CPT | Performed by: PHYSICIAN ASSISTANT

## 2024-05-18 PROCEDURE — 99222 1ST HOSP IP/OBS MODERATE 55: CPT | Performed by: NURSE PRACTITIONER

## 2024-05-18 PROCEDURE — 80047 BASIC METABLC PNL IONIZED CA: CPT

## 2024-05-18 PROCEDURE — 85610 PROTHROMBIN TIME: CPT | Performed by: PHYSICIAN ASSISTANT

## 2024-05-18 PROCEDURE — 87186 SC STD MICRODIL/AGAR DIL: CPT | Performed by: INTERNAL MEDICINE

## 2024-05-18 PROCEDURE — 63710000001 TAMSULOSIN 0.4 MG CAPSULE: Performed by: NURSE PRACTITIONER

## 2024-05-18 PROCEDURE — 63710000001 DONEPEZIL 10 MG TABLET: Performed by: NURSE PRACTITIONER

## 2024-05-18 PROCEDURE — 99285 EMERGENCY DEPT VISIT HI MDM: CPT

## 2024-05-18 PROCEDURE — 87070 CULTURE OTHR SPECIMN AEROBIC: CPT | Performed by: INTERNAL MEDICINE

## 2024-05-18 PROCEDURE — 87147 CULTURE TYPE IMMUNOLOGIC: CPT | Performed by: INTERNAL MEDICINE

## 2024-05-18 PROCEDURE — 25010000002 ONDANSETRON PER 1 MG: Performed by: PHYSICIAN ASSISTANT

## 2024-05-18 PROCEDURE — 63710000001 ASPIRIN 81 MG TABLET DELAYED-RELEASE: Performed by: NURSE PRACTITIONER

## 2024-05-18 PROCEDURE — 63710000001 RISPERIDONE 0.25 MG TABLET: Performed by: NURSE PRACTITIONER

## 2024-05-18 PROCEDURE — 63710000001 CETIRIZINE 10 MG TABLET: Performed by: NURSE PRACTITIONER

## 2024-05-18 PROCEDURE — 25810000003 SODIUM CHLORIDE 0.9 % SOLUTION: Performed by: PHYSICIAN ASSISTANT

## 2024-05-18 PROCEDURE — 25810000003 SODIUM CHLORIDE 0.9 % SOLUTION: Performed by: NURSE PRACTITIONER

## 2024-05-18 PROCEDURE — 84484 ASSAY OF TROPONIN QUANT: CPT | Performed by: PHYSICIAN ASSISTANT

## 2024-05-18 PROCEDURE — 63710000001 MEMANTINE 10 MG TABLET: Performed by: NURSE PRACTITIONER

## 2024-05-18 RX ORDER — ONDANSETRON 4 MG/1
4 TABLET, ORALLY DISINTEGRATING ORAL EVERY 6 HOURS PRN
Status: DISCONTINUED | OUTPATIENT
Start: 2024-05-18 | End: 2024-05-19 | Stop reason: HOSPADM

## 2024-05-18 RX ORDER — POLYETHYLENE GLYCOL 3350 17 G/17G
17 POWDER, FOR SOLUTION ORAL DAILY PRN
Status: DISCONTINUED | OUTPATIENT
Start: 2024-05-18 | End: 2024-05-19 | Stop reason: HOSPADM

## 2024-05-18 RX ORDER — AMOXICILLIN 250 MG
2 CAPSULE ORAL 2 TIMES DAILY PRN
Status: DISCONTINUED | OUTPATIENT
Start: 2024-05-18 | End: 2024-05-19 | Stop reason: HOSPADM

## 2024-05-18 RX ORDER — SODIUM CHLORIDE 0.9 % (FLUSH) 0.9 %
10 SYRINGE (ML) INJECTION AS NEEDED
Status: DISCONTINUED | OUTPATIENT
Start: 2024-05-18 | End: 2024-05-19 | Stop reason: HOSPADM

## 2024-05-18 RX ORDER — BISACODYL 5 MG/1
5 TABLET, DELAYED RELEASE ORAL DAILY PRN
Status: DISCONTINUED | OUTPATIENT
Start: 2024-05-18 | End: 2024-05-19 | Stop reason: HOSPADM

## 2024-05-18 RX ORDER — ACETAMINOPHEN 160 MG/5ML
650 SOLUTION ORAL EVERY 4 HOURS PRN
Status: DISCONTINUED | OUTPATIENT
Start: 2024-05-18 | End: 2024-05-19 | Stop reason: HOSPADM

## 2024-05-18 RX ORDER — SODIUM CHLORIDE 0.9 % (FLUSH) 0.9 %
10 SYRINGE (ML) INJECTION EVERY 12 HOURS SCHEDULED
Status: DISCONTINUED | OUTPATIENT
Start: 2024-05-18 | End: 2024-05-19 | Stop reason: HOSPADM

## 2024-05-18 RX ORDER — SODIUM CHLORIDE 9 MG/ML
100 INJECTION, SOLUTION INTRAVENOUS CONTINUOUS
Status: DISCONTINUED | OUTPATIENT
Start: 2024-05-18 | End: 2024-05-19 | Stop reason: HOSPADM

## 2024-05-18 RX ORDER — DONEPEZIL HYDROCHLORIDE 10 MG/1
10 TABLET, FILM COATED ORAL NIGHTLY
Status: DISCONTINUED | OUTPATIENT
Start: 2024-05-18 | End: 2024-05-19 | Stop reason: HOSPADM

## 2024-05-18 RX ORDER — NIFEDIPINE 30 MG/1
60 TABLET, EXTENDED RELEASE ORAL DAILY
Status: DISCONTINUED | OUTPATIENT
Start: 2024-05-18 | End: 2024-05-19 | Stop reason: HOSPADM

## 2024-05-18 RX ORDER — NITROGLYCERIN 0.4 MG/1
0.4 TABLET SUBLINGUAL
Status: DISCONTINUED | OUTPATIENT
Start: 2024-05-18 | End: 2024-05-19 | Stop reason: HOSPADM

## 2024-05-18 RX ORDER — PROMETHAZINE HYDROCHLORIDE 12.5 MG/1
12.5 TABLET ORAL EVERY 6 HOURS PRN
Status: DISCONTINUED | OUTPATIENT
Start: 2024-05-18 | End: 2024-05-19 | Stop reason: HOSPADM

## 2024-05-18 RX ORDER — TAMSULOSIN HYDROCHLORIDE 0.4 MG/1
0.4 CAPSULE ORAL DAILY
Status: DISCONTINUED | OUTPATIENT
Start: 2024-05-18 | End: 2024-05-19 | Stop reason: HOSPADM

## 2024-05-18 RX ORDER — ONDANSETRON 2 MG/ML
4 INJECTION INTRAMUSCULAR; INTRAVENOUS EVERY 6 HOURS PRN
Status: DISCONTINUED | OUTPATIENT
Start: 2024-05-18 | End: 2024-05-19 | Stop reason: HOSPADM

## 2024-05-18 RX ORDER — ASPIRIN 81 MG/1
81 TABLET ORAL DAILY
Status: DISCONTINUED | OUTPATIENT
Start: 2024-05-18 | End: 2024-05-19 | Stop reason: HOSPADM

## 2024-05-18 RX ORDER — ACETAMINOPHEN 650 MG/1
650 SUPPOSITORY RECTAL EVERY 4 HOURS PRN
Status: DISCONTINUED | OUTPATIENT
Start: 2024-05-18 | End: 2024-05-19 | Stop reason: HOSPADM

## 2024-05-18 RX ORDER — ACETAMINOPHEN 325 MG/1
650 TABLET ORAL EVERY 4 HOURS PRN
Status: DISCONTINUED | OUTPATIENT
Start: 2024-05-18 | End: 2024-05-19 | Stop reason: HOSPADM

## 2024-05-18 RX ORDER — RISPERIDONE 0.25 MG/1
0.25 TABLET ORAL 2 TIMES DAILY
Status: DISCONTINUED | OUTPATIENT
Start: 2024-05-18 | End: 2024-05-19 | Stop reason: HOSPADM

## 2024-05-18 RX ORDER — ESCITALOPRAM OXALATE 10 MG/1
10 TABLET ORAL DAILY
Status: DISCONTINUED | OUTPATIENT
Start: 2024-05-18 | End: 2024-05-19 | Stop reason: HOSPADM

## 2024-05-18 RX ORDER — BISACODYL 10 MG
10 SUPPOSITORY, RECTAL RECTAL DAILY PRN
Status: DISCONTINUED | OUTPATIENT
Start: 2024-05-18 | End: 2024-05-19 | Stop reason: HOSPADM

## 2024-05-18 RX ORDER — MEMANTINE HYDROCHLORIDE 10 MG/1
5 TABLET ORAL 2 TIMES DAILY
Status: DISCONTINUED | OUTPATIENT
Start: 2024-05-18 | End: 2024-05-19 | Stop reason: HOSPADM

## 2024-05-18 RX ORDER — SODIUM CHLORIDE 9 MG/ML
40 INJECTION, SOLUTION INTRAVENOUS AS NEEDED
Status: DISCONTINUED | OUTPATIENT
Start: 2024-05-18 | End: 2024-05-19 | Stop reason: HOSPADM

## 2024-05-18 RX ORDER — CETIRIZINE HYDROCHLORIDE 10 MG/1
10 TABLET ORAL DAILY
Status: DISCONTINUED | OUTPATIENT
Start: 2024-05-18 | End: 2024-05-19 | Stop reason: HOSPADM

## 2024-05-18 RX ORDER — ONDANSETRON 2 MG/ML
4 INJECTION INTRAMUSCULAR; INTRAVENOUS ONCE
Status: COMPLETED | OUTPATIENT
Start: 2024-05-18 | End: 2024-05-18

## 2024-05-18 RX ORDER — FINASTERIDE 5 MG/1
5 TABLET, FILM COATED ORAL DAILY
Status: DISCONTINUED | OUTPATIENT
Start: 2024-05-18 | End: 2024-05-19 | Stop reason: HOSPADM

## 2024-05-18 RX ADMIN — SODIUM CHLORIDE 100 ML/HR: 9 INJECTION, SOLUTION INTRAVENOUS at 17:15

## 2024-05-18 RX ADMIN — NIFEDIPINE 60 MG: 30 TABLET, EXTENDED RELEASE ORAL at 17:14

## 2024-05-18 RX ADMIN — MEMANTINE 5 MG: 10 TABLET ORAL at 21:06

## 2024-05-18 RX ADMIN — ESCITALOPRAM OXALATE 10 MG: 10 TABLET ORAL at 17:13

## 2024-05-18 RX ADMIN — ONDANSETRON 4 MG: 2 INJECTION INTRAMUSCULAR; INTRAVENOUS at 11:42

## 2024-05-18 RX ADMIN — DONEPEZIL HYDROCHLORIDE 10 MG: 10 TABLET, FILM COATED ORAL at 21:06

## 2024-05-18 RX ADMIN — RISPERIDONE 0.25 MG: 0.25 TABLET ORAL at 21:06

## 2024-05-18 RX ADMIN — CETIRIZINE HYDROCHLORIDE 10 MG: 10 TABLET, FILM COATED ORAL at 17:13

## 2024-05-18 RX ADMIN — Medication 10 ML: at 21:06

## 2024-05-18 RX ADMIN — SODIUM CHLORIDE 500 ML: 9 INJECTION, SOLUTION INTRAVENOUS at 11:41

## 2024-05-18 RX ADMIN — FINASTERIDE 5 MG: 5 TABLET, FILM COATED ORAL at 17:14

## 2024-05-18 RX ADMIN — TAMSULOSIN HYDROCHLORIDE 0.4 MG: 0.4 CAPSULE ORAL at 17:13

## 2024-05-18 RX ADMIN — ASPIRIN 81 MG: 81 TABLET, COATED ORAL at 17:13

## 2024-05-18 NOTE — H&P
[facility] Medicine Services  HISTORY AND PHYSICAL    Patient Name: Slick Gomez  : 1945  MRN: [id number]  Primary Care Physician: Clayton Vogel DO  Date of admission: 2024    Subjective   Subjective     Chief Complaint:  Nausea, Vomiting     HPI:  Slick Gomez is a 78 y.o. male with past medical history significant for cerebral aneurysm s/p shunt, HTN, HLD, dementia, and depression who presented to the [facility] ED due to 3 days of intractable nausea and vomiting.  Patient reports he lives at a senior living facility and is concerned he may have gotten food poisoning.  He has been unable to keep down any oral intake for the past 2 days.  He denies any recent abdominal pain, fever, chills, diarrhea, shortness of breath, or chest pain.    Of note, he underwent bilateral hydrocelectomy by Dr. Mendez on 2024.  Following the procedure the patient's scrotal wound opened up and began draining purulence.  He presented to [facility] ED on 2024.  Stroke was contacted and recommended local wound care and outpatient antibiotics.  The patient completed Levaquin 500 mg x 7 days.    In the ED, CT of the abdomen/pelvis revealed no acute abdominal or pelvic pathology.  Abdominal x-ray revealed no acute pulmonary process with nonspecific bowel gas pattern.  Labs were reviewed and significant for sodium 136, potassium 6.2, creatinine 3.1, EGFR 19.8, ionized calcium 1.13, and hemoglobin 9.9.  Vital signs were reviewed and are unremarkable.  The patient will be admitted by hospital medicine for further evaluation and treatment.    Personal History     Past Medical History:   Diagnosis Date    Aneurysm     head    Arthritis     Cataract     still present    Headache     Hypertension     Presence of dental bridge     x1- connecting 1 tooth-  dental bridge - bottom right side    Wears glasses              Past Surgical History:   Procedure Laterality  Date    CRANIOTOMY FOR ANEURYSM      HYDROCELE EXCISION / REPAIR  4/22/24    Bilateral    HYDROCELECTOMY      drainage    HYDROCELECTOMY Bilateral 04/22/2024    Procedure: HYDROCELECTOMY BILATERAL;  Surgeon: Christian Mendez MD;  Location: [facility];  Service: Urology;  Laterality: Bilateral;       Family History:  family history is not on file.     Social History:  reports that he quit smoking about 12 years ago. His smoking use included cigarettes. He has been exposed to tobacco smoke. He has never used smokeless tobacco. He reports that he does not currently use alcohol. He reports that he does not use drugs.  Social History     Social History Narrative    Not on file       Medications:  NIFEdipine XL, [place]yster [place] Calcium/D, acetaminophen, aspirin, cetirizine, desmopressin, donepezil, escitalopram, finasteride, fish oil, lisinopril, memantine, risperiDONE, sulfamethoxazole-trimethoprim, and tamsulosin    Allergies   Allergen Reactions    Penicillins Hives     Has tolerated Cefazolin in the past       Objective   Objective     Vital Signs:   Temp:  [97.9 °F (36.6 °C)-98.6 °F (37 °C)] 97.9 °F (36.6 °C)  Heart Rate:  [59-75] 68  Resp:  [16] 16  BP: (115-140)/(52-65) 131/59    Physical Exam   Constitutional: Awake, alert, chronically ill appearing   Eyes: PERRLA, sclerae anicteric, no conjunctival injection  HENT: NCAT, mucous membranes moist  Neck: Supple, no thyromegaly, no lymphadenopathy, trachea midline  Respiratory: Clear to auscultation bilaterally, nonlabored respirations   Cardiovascular: RRR, no murmurs, rubs, or gallops, palpable pedal pulses bilaterally  Gastrointestinal: Positive bowel sounds, soft, nontender, nondistended  : scrotal wound blu with scant purulence noted, malodorous   Musculoskeletal: No bilateral ankle edema, no clubbing or cyanosis to extremities  Psychiatric: flat affect, cooperative  Neurologic: Oriented x 2, poor historian, moves all extremities, Cranial Nerves grossly intact to  confrontation, speech clear  Skin: warm, dry, scrotal wound as above     Result Review:  I have personally reviewed the results from the time of this admission to 5/18/2024 16:39 EDT and agree with these findings:  [x]  Laboratory list / accordion  [x]  Microbiology  [x]  Radiology  [x]  EKG/Telemetry   []  Cardiology/Vascular   []  Pathology  [x]  Old records  []  Other:  Most notable findings include:     LAB RESULTS:      Lab 05/18/24  1126 05/18/24  1058 05/18/24  1046   WBC 7.35  --   --    HEMOGLOBIN 9.9*  --   --    HEMOGLOBIN, POC  --  10.9*  --    HEMATOCRIT 29.8*  --   --    HEMATOCRIT POC  --  32*  --    PLATELETS 244  --   --    NEUTROS ABS 4.54  --   --    IMMATURE GRANS (ABS) 0.01  --   --    LYMPHS ABS 1.69  --   --    MONOS ABS 1.08*  --   --    EOS ABS 0.02  --   --    MCV 87.1  --   --    LACTATE  --   --  1.6   PROTIME  --   --  15.3*   APTT  --   --  25.2*         Lab 05/18/24  1058 05/18/24  1046   SODIUM  --  135*   POTASSIUM  --  5.2   CHLORIDE  --  102   CO2  --  20.9*   ANION GAP  --  12.1   BUN  --  30*   CREATININE 3.10* 2.57*   EGFR 19.8* 24.8*   GLUCOSE  --  95   CALCIUM  --  9.1         Lab 05/18/24  1046   TOTAL PROTEIN 8.3   ALBUMIN 3.4*   GLOBULIN 4.9   ALT (SGPT) 15   AST (SGOT) 34   BILIRUBIN 0.5   ALK PHOS 106         Lab 05/18/24  1126 05/18/24  1046   HSTROP T 15  --    PROTIME  --  15.3*   INR  --  1.16*                 Brief Urine Lab Results  (Last result in the past 365 days)        Color   Clarity   Blood   Leuk Est   Nitrite   Protein   CREAT   Urine HCG        05/18/24 1530 Yellow   Clear   Negative   Negative   Negative   Negative                 Microbiology Results (last 10 days)       Procedure Component Value - Date/Time    COVID-19, FLU A/B, RSV PCR 1 HR TAT - Swab, Nasopharynx [[id number]]  (Normal) Collected: 05/18/24 1111    Lab Status: Final result Specimen: Swab from Nasopharynx Updated: 05/18/24 1211     COVID19 Not Detected     Influenza A PCR Not Detected      Influenza B PCR Not Detected     RSV, PCR Not Detected    Narrative:      Fact sheet for providers: https://www.fda.gov/media/643067/download    Fact sheet for patients: https://www.fda.gov/media/673798/download    Test performed by PCR.            XR Abdomen 2+ VW with Chest 1 VW    Result Date: 5/18/2024  XR ABDOMEN 2+ VIEWS W CHEST 1 VW Date of Exam: 5/18/2024 11:35 AM EDT Indication: abdominal distension Comparison: CT abdomen pelvis May 18, 2024 Findings: There is some elevation right hemidiaphragm which could relate to some underlying lobulation. There are no infiltrates or obvious pleural effusions. On evaluation of the abdomen there is a scattered amount of gas and residue within bowel in a known specific fashion. There are no unusual calcifications.     Impression: Impression: 1.No acute pulmonary process. 2.Nonspecific bowel gas pattern. Electronically Signed: Diomedes Lowry MD  5/18/2024 12:19 PM EDT  Workstation ID: [id number]    CT Abdomen Pelvis Without Contrast    Result Date: 5/18/2024  CT ABDOMEN PELVIS WO CONTRAST Date of Exam: 5/18/2024 11:50 AM EDT Indication: abdominal distention. Comparison: None available. Technique: Axial CT images were obtained of the abdomen and pelvis without the administration of contrast. Reconstructed coronal and sagittal images were also obtained. Automated exposure control and iterative construction methods were used. FINDINGS: Lung bases: Atheromatous disease of the coronary vessels. Calcified granuloma within the left upper lobe. Liver:No masses. No intrahepatic biliary ductal dilatation. Spleen:No masses. No perisplenic hematoma. Pancreas:No pancreatic masses. No evidence of pancreatitis. Gallbladder and common bile duct:No evidence of cholelithiasis. No evidence of cholecystitis. Adrenal glands:No adrenal masses Kidneys and ureters: 1.9 cm x 1.8 cm anterior right renal cyst. No calculi present within the ureters. Normal caliber ureters. Urinary bladder:No urinary  bladder wall thickening. No bladder masses. Small bowel:Normal caliber small bowel. Large bowel:No diverticulosis or diverticulitis. No large bowel masses are appreciated Appendix: Normal GENITOURINARY: Normal prostate Ascites or pneumoperitoneum:None. Adenopathy:None present Osseous structures: Degenerative changes of the hips and lumbar spine. No rib fractures. Other findings: Left-sided ventriculoperitoneal shunt catheter. Atheromatous disease of the abdominal aorta and visualized branches.     Impression: No acute abdominal or pelvic pathology. Electronically Signed: Harjit Don MD  5/18/2024 12:02 PM EDT  Workstation ID: [id number]         Assessment & Plan   Assessment & Plan       Intractable nausea and vomiting    Elevated serum creatinine    Scrotum swelling    Normocytic anemia    Slick Gomez is a 78 y.o. male with past medical history significant for cerebral aneurysm s/p shunt, HTN, HLD, dementia, and depression who presented to the [facility] ED due to 3 days of intractable nausea and vomiting.     Intractable N/V  -CT abdomen/pelvis revealed no acute abdominal or pelvic pathology  -s/p 500 ml NS bolus   -continue NS at 100 m/hr  -Zofran, Phenergan PRN for nausea  -AM labs     Elevated Creatinine  -Data deficit, suspect ZEE  -Creatinine 3.1 on presentation  -avoid nephrotoxins  -s/p 500 ml NS bolus in ED  -continue NS at 100 ml/hr  -Hold home Lisinopril  -BMP in AM     Scrotal wound  -s/p bilateral hydrocelectomy on 4/22/2024 by Dr. Mendez  -Wound culture from 5/4 grew Staphylococcus aureus, E. coli, Acinetobacter baumanni  -Completed Levaquin 500 mg x 7 days on 5/11  -Wound culture pending  -afebrile   -WOCN consult     Hyperkalemia  -K 6.2 on presentation to the ED, but concern for hemolyzed sample  -Repeat BMP stat    Hypocalcemia  -Ionized Calcium 1.13  -Replace per protocol     Normocytic anemia  -Hgb 9.9 on presentation  -Iron profile, ferritin, B12, Folate, retic pending  -CBC  in AM     HTN  -continue Nifedipine  -Holding Lisinopril for now     Dementia  -continue Aricept, Namenda    Mood disorder  -continue Lexapro, Risperdal     BPH   -continue Proscar, Flomax    Hx Cerebral aneurysm   -s/p shunt placement in [place]   -DDAVP listed on home med reconciliation but does not appear to have been filled in [place]  -Pharmacy consulted to assist with home med rec    DVT prophylaxis:  [facility] for now    CODE STATUS:      Expected Discharge  Expected Discharge Date: 5/20/2024; Expected Discharge Time:       Signature: Electronically signed by EDEN Mann, 05/18/24, 4:38 PM EDT

## 2024-05-18 NOTE — FSED PROVIDER NOTE
Subjective   History of Present Illness  A 78-year-old pleasantly demented male presents to the ER with nausea and vomiting.  Wife states he was up throughout most of the night with vomiting.  She denies giving him any medication prior to arrival.  Patient denies any fever, chills, diarrhea, abdominal pain, chest pain, shortness of breath, or urinary symptoms.    History provided by:  Patient and spouse  History limited by:  Dementia      Review of Systems   Constitutional:  Negative for chills and fever.   HENT:  Negative for ear pain and sore throat.    Respiratory:  Negative for cough.    Cardiovascular:  Negative for chest pain.   Gastrointestinal:  Positive for nausea and vomiting. Negative for abdominal pain and diarrhea.   Genitourinary:  Negative for dysuria and frequency.   Neurological:  Negative for dizziness, syncope and headaches.   All other systems reviewed and are negative.      Past Medical History:   Diagnosis Date    Aneurysm     head    Arthritis     Cataract     still present    Headache     Hypertension     Presence of dental bridge     x1- connecting 1 tooth-  dental bridge - bottom right side    Wears glasses        Allergies   Allergen Reactions    Penicillins Hives     Has tolerated Cefazolin in the past       Past Surgical History:   Procedure Laterality Date    CRANIOTOMY FOR ANEURYSM      HYDROCELE EXCISION / REPAIR  24    Bilateral    HYDROCELECTOMY      drainage    HYDROCELECTOMY Bilateral 2024    Procedure: HYDROCELECTOMY BILATERAL;  Surgeon: Christian Mendez MD;  Location: [facility];  Service: Urology;  Laterality: Bilateral;       No family history on file.    Social History     Socioeconomic History    Marital status:    Tobacco Use    Smoking status: Former     Current packs/day: 0.00     Types: Cigarettes     Quit date:      Years since quittin.3     Passive exposure: Past    Smokeless tobacco: Never   Vaping Use    Vaping status: Never Used   Substance  and Sexual Activity    Alcohol use: Not Currently    Drug use: Never    Sexual activity: Not Currently     Partners: Female     Birth control/protection: Abstinence           Objective   Physical Exam  Vitals and nursing note reviewed. Exam conducted with a chaperone present.   Constitutional:       Appearance: Normal appearance. He is normal weight.   HENT:      Head: Normocephalic and atraumatic.   Eyes:      Extraocular Movements: Extraocular movements intact.      Pupils: Pupils are equal, round, and reactive to light.   Cardiovascular:      Rate and Rhythm: Normal rate and regular rhythm.      Pulses: Normal pulses.   Pulmonary:      Effort: Pulmonary effort is normal.      Breath sounds: Normal breath sounds.   Abdominal:      General: Abdomen is flat. Bowel sounds are normal.      Palpations: Abdomen is soft.   Musculoskeletal:         General: Normal range of motion.   Skin:     General: Skin is warm and dry.   Neurological:      General: No focal deficit present.      Mental Status: He is alert and oriented to person, place, and time.   Psychiatric:         Mood and Affect: Mood normal.         Procedures           ED Course  ED Course as of 05/18/24 1300   Sat May 18, 2024   1126 Lactate: 1.6 [WA]   1157 HS Troponin T: 15 [WA]   1157 Creatinine(!): 2.57 [WA]   1157 BUN(!): 30 [WA]   1157 Sodium(!): 135 [WA]   1158 Hemoglobin(!): 9.9 [WA]   1158 Hematocrit(!): 29.8 [WA]   1158 WBC: 7.35 [WA]   1158 PTT(!): 25.2 [WA]   1158 INR(!): 1.16 [WA]   1158 Protime(!): 15.3 [WA]      ED Course User Index  [WA] Natividad Live PA-C                                           Medical Decision Making  Patient was evaluated, labs and imaging were obtained.  Patient was noted to have an acute kidney injury as well as persistent anemia.  Hemoccult was negative here in the ER.  [facility].    Amount and/or Complexity of Data Reviewed  Labs: ordered.  Decision-making details documented in ED Course.  Radiology: ordered.  ECG/medicine tests: ordered.  Discussion of management or test interpretation with external provider(s): Spoke with Dr. Valadez (Hospitalist) at [facility] he accepted the patient for admission at their facility.      Risk  Prescription drug management.        Final diagnoses:   Acute kidney injury   Anemia, unspecified type   Nausea and vomiting, unspecified vomiting type   Weakness       ED Disposition  ED Disposition       ED Disposition   Decision to Admit    Condition   --    Comment   Level of Care: Telemetry [5]   Accepting Provider:: BLANCHE VALADEZ [[id number]]                 No follow-up provider specified.       Medication List      No changes were made to your prescriptions during this visit.

## 2024-05-18 NOTE — ED NOTES
Slick Gomez    Nursing Report ED to Floor:  Mental status: AAOx4 now to AAOx2 to 4 per family  Ambulatory status: 1 to 2 assist   Oxygen Therapy:  RA  Cardiac Rhythm: Sinus  Admitted from: [facility]  Safety Concerns:  Fall risk  Social Issues: None  ED Room #:  9    ED Nurse Phone Extension - [phone].      HPI:   Chief Complaint   Patient presents with    Nausea       Past Medical History:  Past Medical History:   Diagnosis Date    Aneurysm     head    Arthritis     Cataract     still present    Headache     Hypertension     Presence of dental bridge     x1- connecting 1 tooth-  dental bridge - bottom right side    Wears glasses         Past Surgical History:  Past Surgical History:   Procedure Laterality Date    CRANIOTOMY FOR ANEURYSM      HYDROCELE EXCISION / REPAIR  4/22/24    Bilateral    HYDROCELECTOMY      drainage    HYDROCELECTOMY Bilateral 04/22/2024    Procedure: HYDROCELECTOMY BILATERAL;  Surgeon: Christian Mendez MD;  Location: [facility];  Service: Urology;  Laterality: Bilateral;        Admitting Doctor:   No admitting provider for patient encounter.    Consulting Provider(s):  Consults       No orders found from 4/19/2024 to 5/19/2024.             Admitting Diagnosis:   The primary encounter diagnosis was Acute kidney injury. Diagnoses of Anemia, unspecified type, Nausea and vomiting, unspecified vomiting type, and Weakness were also pertinent to this visit.    Most Recent Vitals:   Vitals:    05/18/24 1100 05/18/24 1143 05/18/24 1201 05/18/24 1229   BP: 138/59 121/52 133/57 122/53   Pulse: 69 69 67 60   Resp:       Temp:       TempSrc:       SpO2:  94% 93% 92%   Weight:       Height:           Active LDAs/IV Access:   Lines, Drains & Airways       Active LDAs       Name Placement date Placement time Site Days    Peripheral IV 05/18/24 1057 Anterior;Right Forearm 05/18/24  1057  Forearm  less than 1                    Labs (abnormal labs have a star):   Labs Reviewed   COMPREHENSIVE  METABOLIC PANEL - Abnormal; Notable for the following components:       Result Value    BUN 30 (*)     Creatinine 2.57 (*)     Sodium 135 (*)     CO2 20.9 (*)     Albumin 3.4 (*)     eGFR 24.8 (*)     All other components within normal limits    Narrative:     GFR Normal >60  Chronic Kidney Disease <60  Kidney Failure <15    The GFR formula is only valid for adults with stable renal function between ages 18 and 70.   CBC WITH AUTO DIFFERENTIAL - Abnormal; Notable for the following components:    RBC 3.42 (*)     Hemoglobin 9.9 (*)     Hematocrit 29.8 (*)     RDW 15.7 (*)     Monocyte % 14.7 (*)     Monocytes, Absolute 1.08 (*)     All other components within normal limits   APTT - Abnormal; Notable for the following components:    PTT 25.2 (*)     All other components within normal limits    Narrative:     PTT = The equivalent PTT values for the therapeutic range of heparin levels at 0.3 to 0.5 U/ml are 60 to 70 seconds.   PROTIME-INR - Abnormal; Notable for the following components:    Protime 15.3 (*)     INR 1.16 (*)     All other components within normal limits   POCT CHEM 8 - Abnormal; Notable for the following components:    BUN 40 (*)     Creatinine 3.10 (*)     Sodium 136 (*)     POC Potassium 6.2 (*)     Hemoglobin 10.9 (*)     Hematocrit 32 (*)     Ionized Calcium 1.13 (*)     eGFR 19.8 (*)     All other components within normal limits   COVID-19/FLUA&B/RSV, NP SWAB IN TRANSPORT MEDIA 1 HR TAT - Normal    Narrative:     Fact sheet for providers: https://www.fda.gov/media/102069/download    Fact sheet for patients: https://www.fda.gov/media/272178/download    Test performed by PCR.   LACTIC ACID, PLASMA - Normal   SINGLE HS TROPONIN T - Normal   POCT OCCULT BLOOD STOOL (ED ONLY) - Normal   RAINBOW DRAW    Narrative:     The following orders were created for panel order [place] Draw.  Procedure                               Abnormality         Status                     ---------                                -----------         ------                     Green Top (Gel)[[id number]]                                  Final result               Lavender Top[[id number]]                                     Final result               Gold Top - SST[[id number]]                                   Final result               Kelly Top[[id number]]                                         Final result               Light Blue Top[[id number]]                                   Final result                 Please view results for these tests on the individual orders.   URINALYSIS W/ CULTURE IF INDICATED   CBC AND DIFFERENTIAL    Narrative:     The following orders were created for panel order CBC & Differential.  Procedure                               Abnormality         Status                     ---------                               -----------         ------                     CBC Auto Differential[[id number]]        Abnormal            Final result               Scan Slide[[id number]]                                                                    Please view results for these tests on the individual orders.   GREEN TOP   LAVENDER TOP   GOLD TOP - SST   GRAY TOP   LIGHT BLUE TOP       Meds Given in ED:   Medications   sodium chloride 0.9 % flush 10 mL (has no administration in time range)   sodium chloride 0.9 % flush 10 mL (has no administration in time range)   sodium chloride 0.9 % bolus 500 mL (0 mL Intravenous Stopped 5/18/24 1250)   ondansetron (ZOFRAN) injection 4 mg (4 mg Intravenous Given 5/18/24 1142)           Last NIH score:                                                          Dysphagia screening results:        Jachin Coma Scale:  No data recorded     CIWA:        Restraint Type:            Isolation Status:  No active isolations

## 2024-05-19 ENCOUNTER — READMISSION MANAGEMENT (OUTPATIENT)
Dept: CALL CENTER | Facility: HOSPITAL | Age: 79
End: 2024-05-19
Payer: COMMERCIAL

## 2024-05-19 VITALS
RESPIRATION RATE: 18 BRPM | DIASTOLIC BLOOD PRESSURE: 62 MMHG | TEMPERATURE: 98 F | BODY MASS INDEX: 29.42 KG/M2 | SYSTOLIC BLOOD PRESSURE: 145 MMHG | WEIGHT: 198.63 LBS | OXYGEN SATURATION: 96 % | HEIGHT: 69 IN | HEART RATE: 57 BPM

## 2024-05-19 PROBLEM — N17.9 AKI (ACUTE KIDNEY INJURY): Status: ACTIVE | Noted: 2024-05-19

## 2024-05-19 PROBLEM — R11.2 INTRACTABLE NAUSEA AND VOMITING: Status: RESOLVED | Noted: 2024-05-18 | Resolved: 2024-05-19

## 2024-05-19 LAB
ALBUMIN SERPL-MCNC: 2.9 G/DL (ref 3.5–5.2)
ALBUMIN/GLOB SERPL: 0.6 G/DL
ALP SERPL-CCNC: 93 U/L (ref 39–117)
ALT SERPL W P-5'-P-CCNC: 12 U/L (ref 1–41)
ANION GAP SERPL CALCULATED.3IONS-SCNC: 10 MMOL/L (ref 5–15)
AST SERPL-CCNC: 27 U/L (ref 1–40)
BASOPHILS # BLD AUTO: 0.02 10*3/MM3 (ref 0–0.2)
BASOPHILS NFR BLD AUTO: 0.3 % (ref 0–1.5)
BILIRUB SERPL-MCNC: 0.3 MG/DL (ref 0–1.2)
BUN SERPL-MCNC: 26 MG/DL (ref 8–23)
BUN/CREAT SERPL: 13.1 (ref 7–25)
CA-I SERPL ISE-MCNC: 1.19 MMOL/L (ref 1.12–1.32)
CALCIUM SPEC-SCNC: 8.1 MG/DL (ref 8.6–10.5)
CHLORIDE SERPL-SCNC: 105 MMOL/L (ref 98–107)
CO2 SERPL-SCNC: 21 MMOL/L (ref 22–29)
CREAT SERPL-MCNC: 1.98 MG/DL (ref 0.76–1.27)
DEPRECATED RDW RBC AUTO: 53.4 FL (ref 37–54)
EGFRCR SERPLBLD CKD-EPI 2021: 33.9 ML/MIN/1.73
EOSINOPHIL # BLD AUTO: 0.04 10*3/MM3 (ref 0–0.4)
EOSINOPHIL NFR BLD AUTO: 0.6 % (ref 0.3–6.2)
ERYTHROCYTE [DISTWIDTH] IN BLOOD BY AUTOMATED COUNT: 15.8 % (ref 12.3–15.4)
FERRITIN SERPL-MCNC: 171.3 NG/ML (ref 30–400)
FOLATE SERPL-MCNC: 6.23 NG/ML (ref 4.78–24.2)
GLOBULIN UR ELPH-MCNC: 4.6 GM/DL
GLUCOSE SERPL-MCNC: 93 MG/DL (ref 65–99)
HCT VFR BLD AUTO: 29.3 % (ref 37.5–51)
HGB BLD-MCNC: 9.2 G/DL (ref 13–17.7)
IMM GRANULOCYTES # BLD AUTO: 0.02 10*3/MM3 (ref 0–0.05)
IMM GRANULOCYTES NFR BLD AUTO: 0.3 % (ref 0–0.5)
IRON 24H UR-MRATE: 27 MCG/DL (ref 59–158)
IRON SATN MFR SERPL: 10 % (ref 20–50)
LYMPHOCYTES # BLD AUTO: 1.06 10*3/MM3 (ref 0.7–3.1)
LYMPHOCYTES NFR BLD AUTO: 15.5 % (ref 19.6–45.3)
MAGNESIUM SERPL-MCNC: 2.1 MG/DL (ref 1.6–2.4)
MCH RBC QN AUTO: 28.8 PG (ref 26.6–33)
MCHC RBC AUTO-ENTMCNC: 31.4 G/DL (ref 31.5–35.7)
MCV RBC AUTO: 91.8 FL (ref 79–97)
MONOCYTES # BLD AUTO: 0.88 10*3/MM3 (ref 0.1–0.9)
MONOCYTES NFR BLD AUTO: 12.9 % (ref 5–12)
NEUTROPHILS NFR BLD AUTO: 4.82 10*3/MM3 (ref 1.7–7)
NEUTROPHILS NFR BLD AUTO: 70.4 % (ref 42.7–76)
NRBC BLD AUTO-RTO: 0 /100 WBC (ref 0–0.2)
PLATELET # BLD AUTO: 174 10*3/MM3 (ref 140–450)
PMV BLD AUTO: 10.3 FL (ref 6–12)
POTASSIUM SERPL-SCNC: 5.2 MMOL/L (ref 3.5–5.2)
POTASSIUM SERPL-SCNC: 5.7 MMOL/L (ref 3.5–5.2)
PROT SERPL-MCNC: 7.5 G/DL (ref 6–8.5)
RBC # BLD AUTO: 3.19 10*6/MM3 (ref 4.14–5.8)
RETICS # AUTO: 0.08 10*6/MM3 (ref 0.02–0.13)
RETICS/RBC NFR AUTO: 2.45 % (ref 0.7–1.9)
SODIUM SERPL-SCNC: 136 MMOL/L (ref 136–145)
TIBC SERPL-MCNC: 261 MCG/DL (ref 298–536)
TRANSFERRIN SERPL-MCNC: 175 MG/DL (ref 200–360)
VIT B12 BLD-MCNC: 1076 PG/ML (ref 211–946)
WBC NRBC COR # BLD AUTO: 6.84 10*3/MM3 (ref 3.4–10.8)

## 2024-05-19 PROCEDURE — 63710000001 TAMSULOSIN 0.4 MG CAPSULE: Performed by: NURSE PRACTITIONER

## 2024-05-19 PROCEDURE — A9270 NON-COVERED ITEM OR SERVICE: HCPCS | Performed by: NURSE PRACTITIONER

## 2024-05-19 PROCEDURE — 63710000001 CETIRIZINE 10 MG TABLET: Performed by: NURSE PRACTITIONER

## 2024-05-19 PROCEDURE — 85025 COMPLETE CBC W/AUTO DIFF WBC: CPT | Performed by: NURSE PRACTITIONER

## 2024-05-19 PROCEDURE — 63710000001 FINASTERIDE 5 MG TABLET: Performed by: NURSE PRACTITIONER

## 2024-05-19 PROCEDURE — 82746 ASSAY OF FOLIC ACID SERUM: CPT | Performed by: NURSE PRACTITIONER

## 2024-05-19 PROCEDURE — 84466 ASSAY OF TRANSFERRIN: CPT | Performed by: NURSE PRACTITIONER

## 2024-05-19 PROCEDURE — 83735 ASSAY OF MAGNESIUM: CPT | Performed by: NURSE PRACTITIONER

## 2024-05-19 PROCEDURE — 99239 HOSP IP/OBS DSCHRG MGMT >30: CPT | Performed by: INTERNAL MEDICINE

## 2024-05-19 PROCEDURE — G0378 HOSPITAL OBSERVATION PER HR: HCPCS

## 2024-05-19 PROCEDURE — 63710000001 ASPIRIN 81 MG TABLET DELAYED-RELEASE: Performed by: NURSE PRACTITIONER

## 2024-05-19 PROCEDURE — 63710000001 NIFEDIPINE XL 30 MG TABLET SUSTAINED-RELEASE 24 HOUR: Performed by: NURSE PRACTITIONER

## 2024-05-19 PROCEDURE — 97166 OT EVAL MOD COMPLEX 45 MIN: CPT

## 2024-05-19 PROCEDURE — 63710000001 ESCITALOPRAM 10 MG TABLET: Performed by: NURSE PRACTITIONER

## 2024-05-19 PROCEDURE — 97161 PT EVAL LOW COMPLEX 20 MIN: CPT

## 2024-05-19 PROCEDURE — 83540 ASSAY OF IRON: CPT | Performed by: NURSE PRACTITIONER

## 2024-05-19 PROCEDURE — 80053 COMPREHEN METABOLIC PANEL: CPT | Performed by: NURSE PRACTITIONER

## 2024-05-19 PROCEDURE — 63710000001 MEMANTINE 10 MG TABLET: Performed by: NURSE PRACTITIONER

## 2024-05-19 PROCEDURE — 82728 ASSAY OF FERRITIN: CPT | Performed by: NURSE PRACTITIONER

## 2024-05-19 PROCEDURE — 85045 AUTOMATED RETICULOCYTE COUNT: CPT | Performed by: NURSE PRACTITIONER

## 2024-05-19 PROCEDURE — 82330 ASSAY OF CALCIUM: CPT | Performed by: NURSE PRACTITIONER

## 2024-05-19 PROCEDURE — 63710000001 RISPERIDONE 0.25 MG TABLET: Performed by: NURSE PRACTITIONER

## 2024-05-19 PROCEDURE — 84132 ASSAY OF SERUM POTASSIUM: CPT | Performed by: INTERNAL MEDICINE

## 2024-05-19 PROCEDURE — 82607 VITAMIN B-12: CPT | Performed by: NURSE PRACTITIONER

## 2024-05-19 RX ORDER — ONDANSETRON 4 MG/1
4 TABLET, ORALLY DISINTEGRATING ORAL EVERY 6 HOURS PRN
Qty: 30 TABLET | Refills: 0 | Status: SHIPPED | OUTPATIENT
Start: 2024-05-19

## 2024-05-19 RX ADMIN — NIFEDIPINE 60 MG: 30 TABLET, EXTENDED RELEASE ORAL at 08:20

## 2024-05-19 RX ADMIN — FINASTERIDE 5 MG: 5 TABLET, FILM COATED ORAL at 08:20

## 2024-05-19 RX ADMIN — RISPERIDONE 0.25 MG: 0.25 TABLET ORAL at 08:20

## 2024-05-19 RX ADMIN — CETIRIZINE HYDROCHLORIDE 10 MG: 10 TABLET, FILM COATED ORAL at 08:20

## 2024-05-19 RX ADMIN — Medication 10 ML: at 09:38

## 2024-05-19 RX ADMIN — ASPIRIN 81 MG: 81 TABLET, COATED ORAL at 08:20

## 2024-05-19 RX ADMIN — ESCITALOPRAM OXALATE 10 MG: 10 TABLET ORAL at 08:20

## 2024-05-19 RX ADMIN — MEMANTINE 5 MG: 10 TABLET ORAL at 08:20

## 2024-05-19 RX ADMIN — TAMSULOSIN HYDROCHLORIDE 0.4 MG: 0.4 CAPSULE ORAL at 09:37

## 2024-05-19 NOTE — PLAN OF CARE
Problem: Adult Inpatient Plan of Care  Goal: Plan of Care Review  Outcome: Ongoing, Progressing  Goal: Patient-Specific Goal (Individualized)  Outcome: Ongoing, Progressing  Goal: Absence of Hospital-Acquired Illness or Injury  Outcome: Ongoing, Progressing  Intervention: Identify and Manage Fall Risk  Recent Flowsheet Documentation  Taken 5/19/2024 0416 by Venecia Cardona RN  Safety Promotion/Fall Prevention: safety round/check completed  Taken 5/19/2024 0215 by Venecia Cardona RN  Safety Promotion/Fall Prevention: safety round/check completed  Taken 5/19/2024 0000 by Venecia Cardona RN  Safety Promotion/Fall Prevention: safety round/check completed  Taken 5/18/2024 2000 by Venecia Cardona RN  Safety Promotion/Fall Prevention:   assistive device/personal items within reach   clutter free environment maintained   fall prevention program maintained   lighting adjusted   muscle strengthening facilitated   room organization consistent   safety round/check completed  Intervention: Prevent Skin Injury  Recent Flowsheet Documentation  Taken 5/19/2024 0416 by Venecia Cardona RN  Body Position: position changed independently  Taken 5/19/2024 0215 by Venecia Cardona RN  Body Position: position changed independently  Taken 5/19/2024 0000 by Venecia Cardona RN  Body Position: position changed independently  Taken 5/18/2024 2200 by Venecia Cardona RN  Body Position: position changed independently  Taken 5/18/2024 2000 by Venecia Cardona RN  Body Position: position changed independently  Skin Protection:   adhesive use limited   transparent dressing maintained   tubing/devices free from skin contact  Intervention: Prevent and Manage VTE (Venous Thromboembolism) Risk  Recent Flowsheet Documentation  Taken 5/18/2024 2000 by Venecia Cardona RN  Activity Management: ambulated in room  VTE Prevention/Management:   bilateral   sequential compression devices on  Range of Motion: active ROM (range of motion)  encouraged  Intervention: Prevent Infection  Recent Flowsheet Documentation  Taken 5/18/2024 2000 by Venecia Cardona RN  Infection Prevention:   environmental surveillance performed   equipment surfaces disinfected   rest/sleep promoted   single patient room provided  Goal: Optimal Comfort and Wellbeing  Outcome: Ongoing, Progressing  Intervention: Monitor Pain and Promote Comfort  Recent Flowsheet Documentation  Taken 5/18/2024 2200 by Venecia Cardona RN  Pain Management Interventions:   pillow support provided   quiet environment facilitated  Taken 5/18/2024 2000 by Venecia Cardona RN  Pain Management Interventions:   quiet environment facilitated   pillow support provided  Intervention: Provide Person-Centered Care  Recent Flowsheet Documentation  Taken 5/18/2024 2200 by Venecia Cardona RN  Trust Relationship/Rapport:   care explained   choices provided   emotional support provided   empathic listening provided   questions answered   questions encouraged   reassurance provided   thoughts/feelings acknowledged  Taken 5/18/2024 2000 by Venecia Cardona RN  Trust Relationship/Rapport:   care explained   choices provided   emotional support provided   empathic listening provided   questions answered   questions encouraged   reassurance provided   thoughts/feelings acknowledged  Goal: Readiness for Transition of Care  Outcome: Ongoing, Progressing     Problem: Fall Injury Risk  Goal: Absence of Fall and Fall-Related Injury  Outcome: Ongoing, Progressing  Intervention: Identify and Manage Contributors  Recent Flowsheet Documentation  Taken 5/18/2024 2000 by Venecia Cardona RN  Medication Review/Management: medications reviewed  Self-Care Promotion:   independence encouraged   BADL personal objects within reach   BADL personal routines maintained  Intervention: Promote Injury-Free Environment  Recent Flowsheet Documentation  Taken 5/19/2024 0416 by Venecia Cardona, RN  Safety Promotion/Fall Prevention: safety  round/check completed  Taken 5/19/2024 0215 by Venecia Cardona, RN  Safety Promotion/Fall Prevention: safety round/check completed  Taken 5/19/2024 0000 by Venecia Cardona, RN  Safety Promotion/Fall Prevention: safety round/check completed  Taken 5/18/2024 2000 by Venecia Cardona, RN  Safety Promotion/Fall Prevention:   assistive device/personal items within reach   clutter free environment maintained   fall prevention program maintained   lighting adjusted   muscle strengthening facilitated   room organization consistent   safety round/check completed   Goal Outcome Evaluation:   Patient rested well throughout the night. No complaints of pain or dysuria. IVFs infusing per order. Oriented x 4.

## 2024-05-19 NOTE — DISCHARGE SUMMARY
[facility]  DISCHARGE SUMMARY    Patient Name: Slick Gomez  : 1945  MRN: [id number]    Date of Admission: 2024 10:44 AM  Date of Discharge:  2024  Primary Care Physician: Clayton Vogel,     Consults       No orders found for last 30 day(s).            Hospital Course     Presenting Problem: ZEE    Active Hospital Problems    Diagnosis  POA    ZEE (acute kidney injury) [N17.9]  Yes    Elevated serum creatinine [R79.89]  Yes    Scrotum swelling [N50.89]  Yes    Normocytic anemia [D64.9]  Yes      Resolved Hospital Problems    Diagnosis Date Resolved POA    **Intractable nausea and vomiting [R11.2] 2024 Yes          Hospital Course:  Slick Gomez is a 78 y.o. male with past medical history significant for cerebral aneurysm s/p shunt, HTN, HLD, dementia, and depression as well as recent bilateral hydrocelectomy on 24 with post-op cultures positive for Staph, E coli and Acinteobacter s/p 7 days of Levaquin (vs Bactrim, med rec has Bactrim listed) who presented to the [facility] ED due to 3 days of intractable nausea and vomiting. He was found to have ZEE as well as hyperkalemia.      Intractable N/V  -CT abdomen/pelvis revealed no acute abdominal or pelvic pathology  -- resolved  -- continue with PRN Zofran upon d/c  -- given IVFs  -- tolerating PO intake well now      Elevated Creatinine  -Data deficit, suspect ZEE. Likely due to prerenal vs due to recent ABX (Levaquin vs Bactrim)  -Creatinine 3.1 on presentation, improved to <2 this am   -avoid nephrotoxins  -Hold home Lisinopril for time being  -- s/p IVFs while admitted      Scrotal wound  -s/p bilateral hydrocelectomy on 2024 by Dr. Mendez  -Wound culture from  grew Staphylococcus aureus, E. coli, Acinetobacter baumanni  -Completed Levaquin 500 mg (? Bactrim) x 7 days on   -repeat Wound cultures pending  -afebrile   -WOCN consult       Hyperkalemia  -K 6.2 on presentation to the ED, but concern for hemolyzed sample  -Repeat BMP shows K+ was 5.2     Hypocalcemia  -Replaced per protocol      Normocytic anemia  -Hgb 9.9 on presentation  -- needs further workup as outpatient     HTN  -continue Nifedipine  -Holding Lisinopril for now. Would continue to hold until follow up with PCP     Dementia  -continue Aricept, Namenda     Mood disorder  -continue Lexapro, Risperdal      BPH   -continue Proscar, Flomax     Hx Cerebral aneurysm   -s/p shunt placement in [place]   -DDAVP listed on home med reconciliation but does not appear to have been filled in [place], will continue    Discharge Follow Up Recommendations for outpatient labs/diagnostics:   Follow up with PCP within one week with repeat labs. Will need to discuss resuming ACEI at that time.   Day of Discharge     HPI:   Doing well this am, wants to go home today. No further N/V.  Feels well.     Review of Systems  Gen- No fevers, chills  CV- No chest pain, palpitations  Resp- No cough, dyspnea  GI- No N/V/D, abd pain      Vital Signs:   Temp:  [97.8 °F (36.6 °C)-98.4 °F (36.9 °C)] 98.2 °F (36.8 °C)  Heart Rate:  [] 66  Resp:  [16-18] 18  BP: (115-160)/(52-66) 137/64      Physical Exam:  Constitutional: No acute distress, awake, alert  HENT: NCAT, mucous membranes moist  Respiratory: Clear to auscultation bilaterally, respiratory effort normal   Cardiovascular: RRR, no murmurs, rubs, or gallops  Gastrointestinal: Positive bowel sounds, soft, nontender, nondistended  Musculoskeletal: No bilateral ankle edema  Psychiatric: Appropriate affect, cooperative  Neurologic: Oriented x 3, strength symmetric in all extremities, Cranial Nerves grossly intact to confrontation, speech clear  Skin: No rashes    Pertinent  and/or Most Recent Results     LAB RESULTS:      Lab 05/19/24  0417 05/18/24  1126 05/18/24  1058 05/18/24  1046   WBC 6.84 7.35  --   --    HEMOGLOBIN 9.2* 9.9*  --   --    HEMOGLOBIN, POC   --   --  10.9*  --    HEMATOCRIT 29.3* 29.8*  --   --    HEMATOCRIT POC  --   --  32*  --    PLATELETS 174 244  --   --    NEUTROS ABS 4.82 4.54  --   --    IMMATURE GRANS (ABS) 0.02 0.01  --   --    LYMPHS ABS 1.06 1.69  --   --    MONOS ABS 0.88 1.08*  --   --    EOS ABS 0.04 0.02  --   --    MCV 91.8 87.1  --   --    LACTATE  --   --   --  1.6   PROTIME  --   --   --  15.3*   APTT  --   --   --  25.2*         Lab 05/19/24  0736 05/19/24  0418 05/18/24  1618 05/18/24  1058 05/18/24  1046   SODIUM  --  136 134*  --  135*   POTASSIUM 5.2 5.7* 5.2  --  5.2   CHLORIDE  --  105 104  --  102   CO2  --  21.0* 23.0  --  20.9*   ANION GAP  --  10.0 7.0  --  12.1   BUN  --  26* 28*  --  30*   CREATININE  --  1.98* 2.44* 3.10* 2.57*   EGFR  --  33.9* 26.4* 19.8* 24.8*   GLUCOSE  --  93 94  --  95   CALCIUM  --  8.1* 8.9  --  9.1   IONIZED CALCIUM  --  1.19  --   --   --    MAGNESIUM  --  2.1  --   --   --          Lab 05/19/24  0418 05/18/24  1046   TOTAL PROTEIN 7.5 8.3   ALBUMIN 2.9* 3.4*   GLOBULIN 4.6 4.9   ALT (SGPT) 12 15   AST (SGOT) 27 34   BILIRUBIN 0.3 0.5   ALK PHOS 93 106         Lab 05/18/24  1126 05/18/24  1046   HSTROP T 15  --    PROTIME  --  15.3*   INR  --  1.16*             Lab 05/19/24  0418   IRON 27*   IRON SATURATION (TSAT) 10*   TIBC 261*   TRANSFERRIN 175*   FERRITIN 171.30         Brief Urine Lab Results  (Last result in the past 365 days)        Color   Clarity   Blood   Leuk Est   Nitrite   Protein   CREAT   Urine HCG        05/18/24 1530 Yellow   Clear   Negative   Negative   Negative   Negative                 Microbiology Results (last 10 days)       Procedure Component Value - Date/Time    Wound Culture - Wound, Scrotum [[id number]] Collected: 05/18/24 1544    Lab Status: Preliminary result Specimen: Wound from Scrotum Updated: 05/19/24 0803     Wound Culture Growth present, too young to evaluate     Gram Stain Few (2+) WBCs seen      Few (2+) Gram positive bacilli      Few (2+) Gram positive  cocci in pairs    COVID-19, FLU A/B, RSV PCR 1 HR TAT - Swab, Nasopharynx [[id number]]  (Normal) Collected: 05/18/24 1111    Lab Status: Final result Specimen: Swab from Nasopharynx Updated: 05/18/24 1211     COVID19 Not Detected     Influenza A PCR Not Detected     Influenza B PCR Not Detected     RSV, PCR Not Detected    Narrative:      Fact sheet for providers: https://www.fda.gov/media/940327/download    Fact sheet for patients: https://www.fda.gov/media/077879/download    Test performed by PCR.            XR Abdomen 2+ VW with Chest 1 VW    Result Date: 5/18/2024  XR ABDOMEN 2+ VIEWS W CHEST 1 VW Date of Exam: 5/18/2024 11:35 AM EDT Indication: abdominal distension Comparison: CT abdomen pelvis May 18, 2024 Findings: There is some elevation right hemidiaphragm which could relate to some underlying lobulation. There are no infiltrates or obvious pleural effusions. On evaluation of the abdomen there is a scattered amount of gas and residue within bowel in a known specific fashion. There are no unusual calcifications.     Impression: 1.No acute pulmonary process. 2.Nonspecific bowel gas pattern. Electronically Signed: Diomedes Lowry MD  5/18/2024 12:19 PM EDT  Workstation ID: [id number]    CT Abdomen Pelvis Without Contrast    Result Date: 5/18/2024  CT ABDOMEN PELVIS WO CONTRAST Date of Exam: 5/18/2024 11:50 AM EDT Indication: abdominal distention. Comparison: None available. Technique: Axial CT images were obtained of the abdomen and pelvis without the administration of contrast. Reconstructed coronal and sagittal images were also obtained. Automated exposure control and iterative construction methods were used. FINDINGS: Lung bases: Atheromatous disease of the coronary vessels. Calcified granuloma within the left upper lobe. Liver:No masses. No intrahepatic biliary ductal dilatation. Spleen:No masses. No perisplenic hematoma. Pancreas:No pancreatic masses. No evidence of pancreatitis. Gallbladder and common bile  duct:No evidence of cholelithiasis. No evidence of cholecystitis. Adrenal glands:No adrenal masses Kidneys and ureters: 1.9 cm x 1.8 cm anterior right renal cyst. No calculi present within the ureters. Normal caliber ureters. Urinary bladder:No urinary bladder wall thickening. No bladder masses. Small bowel:Normal caliber small bowel. Large bowel:No diverticulosis or diverticulitis. No large bowel masses are appreciated Appendix: Normal GENITOURINARY: Normal prostate Ascites or pneumoperitoneum:None. Adenopathy:None present Osseous structures: Degenerative changes of the hips and lumbar spine. No rib fractures. Other findings: Left-sided ventriculoperitoneal shunt catheter. Atheromatous disease of the abdominal aorta and visualized branches.     No acute abdominal or pelvic pathology. Electronically Signed: Harjit Don MD  5/18/2024 12:02 PM EDT  Workstation ID: [id number]                 Plan for Follow-up of Pending Labs/Results:   Pending Labs       Order Current Status    Folate In process    Vitamin B12 In process    Wound Culture - Wound, Scrotum Preliminary result          Discharge Details        Discharge Medications        New Medications        Instructions Start Date   ondansetron ODT 4 MG disintegrating tablet  Commonly known as: ZOFRAN-ODT   4 mg, Oral, Every 6 Hours PRN             Continue These Medications        Instructions Start Date   acetaminophen 325 MG tablet  Commonly known as: TYLENOL   650 mg, Oral, Every 6 Hours PRN      aspirin EC 81 MG EC tablet  Generic drug: aspirin   81 mg, Oral, Daily      cetirizine 10 MG tablet  Commonly known as: zyrTEC   10 mg, Oral, Daily      desmopressin 0.2 MG tablet  Commonly known as: DDAVP   0.2 mg, Oral, Daily      donepezil 10 MG tablet  Commonly known as: ARICEPT   10 mg, Oral, Nightly      escitalopram 10 MG tablet  Commonly known as: LEXAPRO   10 mg, Oral, Daily      finasteride 5 MG tablet  Commonly known as: PROSCAR   5 mg, Oral, Daily      fish oil  1000 MG capsule capsule   Oral, 2 Times Daily With Meals      memantine 10 MG tablet  Commonly known as: Namenda   10 mg, Oral, 2 Times Daily      NIFEdipine XL 60 MG 24 hr tablet  Commonly known as: PROCARDIA XL   60 mg, Oral, Daily      Oyster Shell Calcium/D 500-10 MG-MCG tablet tablet   Oral, Daily      risperiDONE 0.25 MG tablet  Commonly known as: risperDAL   0.25 mg, Oral, 2 Times Daily      tamsulosin 0.4 MG capsule 24 hr capsule  Commonly known as: FLOMAX   1 capsule, Oral, Daily             Stop These Medications      lisinopril 40 MG tablet  Commonly known as: PRINIVIL,ZESTRIL            ASK your doctor about these medications        Instructions Start Date   sulfamethoxazole-trimethoprim 800-160 MG per tablet  Commonly known as: Bactrim DS  Ask about: Should I take this medication?   2 tablets, Oral, 2 Times Daily               Allergies   Allergen Reactions    Penicillins Hives     Has tolerated Cefazolin in the past         Discharge Disposition:  Home or Self Care    Diet:  Hospital:  Diet Order   Procedures    Diet: Cardiac; Healthy Heart (2-3 Na+); Fluid Consistency: Thin (IDDSI 0)            Activity:      Restrictions or Other Recommendations:         CODE STATUS:    Code Status and Medical Interventions:   Ordered at: 05/18/24 1639     Level Of Support Discussed With:    Patient     Code Status (Patient has no pulse and is not breathing):    CPR (Attempt to Resuscitate)     Medical Interventions (Patient has pulse or is breathing):    Full Support       Future Appointments   Date Time Provider Department Center   5/31/2024 11:00 AM Christian Mendez MD [facility]   8/13/2024  1:00 PM Clayton Vogel DO [facility]   5/7/2025 11:30 AM Alexis Broussard DNP, APRN [facility]       Additional Instructions for the Follow-ups that You Need to Schedule       Discharge Follow-up with PCP   As directed       Currently Documented PCP:    Clayton Vogel DO    PCP Phone Number:     [phone]     Follow Up Details: in one week with PCP                      Emely Sadns MD  05/19/24      Time Spent on Discharge:  I spent  35  minutes on this discharge activity which included: face-to-face encounter with the patient, reviewing the data in the system, coordination of the care with the nursing staff as well as consultants, documentation, and entering orders.

## 2024-05-19 NOTE — OUTREACH NOTE
Prep Survey      Flowsheet Row Responses   [facility], [place], operated by [facility] patient discharged from? [facility]   Is LACE score < 7 ? Yes   Eligibility [facility]   Date of Admission 05/18/24   Date of Discharge 05/19/24   Discharge Disposition Home or Self Care   Discharge diagnosis Intractable nausea and vomiting   Does the patient have one of the following disease processes/diagnoses(primary or secondary)? Other   Does the patient have Home health ordered? No   Is there a DME ordered? No   Prep survey completed? Yes            HARPER JONES - Registered Nurse

## 2024-05-19 NOTE — PLAN OF CARE
Goal Outcome Evaluation:  Plan of Care Reviewed With: patient           Outcome Evaluation: Patient presents near his functional baseline with mild deficits in balance and strength. He was able to ambulate 350' CGA unsupported without overt LOB. IPPT is indicated to address current deficits. Recommend D/C back to his [facility] when medically appropriate. Recommend trialing SPC next session per patient's baseline.      Anticipated Discharge Disposition (PT): assisted living

## 2024-05-19 NOTE — THERAPY EVALUATION
Patient Name: Slick Gomez  : 1945    MRN: [id number]                              Today's Date: 2024       Admit Date: 2024    Visit Dx:     ICD-10-CM ICD-9-CM   1. Acute kidney injury  N17.9 584.9   2. Anemia, unspecified type  D64.9 285.9   3. Nausea and vomiting, unspecified vomiting type  R11.2 787.01   4. Weakness  R53.1 780.79     Patient Active Problem List   Diagnosis    Hydrocele    Intractable nausea and vomiting    Elevated serum creatinine    Scrotum swelling    Normocytic anemia     Past Medical History:   Diagnosis Date    Aneurysm     head    Arthritis     Cataract     still present    Headache     Hypertension     Presence of dental bridge     x1- connecting 1 tooth-  dental bridge - bottom right side    Wears glasses      Past Surgical History:   Procedure Laterality Date    CRANIOTOMY FOR ANEURYSM      HYDROCELE EXCISION / REPAIR  24    Bilateral    HYDROCELECTOMY      drainage    HYDROCELECTOMY Bilateral 2024    Procedure: HYDROCELECTOMY BILATERAL;  Surgeon: Christian Mendez MD;  [facility]  Service: Urology;  Laterality: Bilateral;      General Information       Row Name 24 0855          Physical Therapy Time and Intention    Document Type evaluation  -CK     Mode of Treatment physical therapy;individual therapy  -CK       Row Name 24 0855          General Information    Patient Profile Reviewed yes  -CK     Prior Level of Function independent:;all household mobility;ADL's;dependent:;cooking  [place] with [facility], utilizes facility for meals  -CK     Existing Precautions/Restrictions other (see comments)  scrotal wound  -CK     Barriers to Rehab none identified  -CK       Row Name 24 0855          Living Environment    People in Home facility resident;other (see comments)  [facility]  -CK       Row Name 24 0855          Home Main Entrance    Number of Stairs, Main Entrance none  -CK       Row Name 24 0855          Stairs  Within Home, Primary    Number of Stairs, Within Home, Primary none  -CK       Row Name 05/19/24 0855          Cognition    Orientation Status (Cognition) oriented x 4;other (see comments)  knows he is in hospital, unable to state [employer]  -CK       Row Name 05/19/24 0855          Safety Issues, Functional Mobility    Safety Issues Affecting Function (Mobility) insight into deficits/self-awareness;safety precaution awareness  -CK     Impairments Affecting Function (Mobility) balance;endurance/activity tolerance;postural/trunk control;strength  -CK               User Key  (r) = Recorded By, (t) = Taken By, (c) = Cosigned By      Initials Name Provider Type    CK Samira Norman PT Physical Therapist                   Mobility       Row Name 05/19/24 0857          Bed Mobility    Bed Mobility supine-sit  -CK     Supine-Sit [place] (Bed Mobility) independent  -CK       Row Name 05/19/24 0857          Sit-Stand Transfer    Sit-Stand [place] (Transfers) standby assist  -CK       Row Name 05/19/24 0857          Gait/Stairs (Locomotion)    [place] Level (Gait) contact guard  -CK     Patient was able to Ambulate yes  -CK     Distance in Feet (Gait) 350  -CK     Deviations/Abnormal Patterns (Gait) bilateral deviations;base of support, wide;gait speed decreased;stride length decreased  -CK     Bilateral Gait Deviations forward flexed posture;heel strike decreased  -CK     Comment, (Gait/Stairs) Patient ambulated in john with step through gait pattern, intermittently reaching out for handrail in john during turns but did not have any LOB or unsteadiness. Ambulates with SPC at baseline, recommend trialing this next session.  -CK               User Key  (r) = Recorded By, (t) = Taken By, (c) = Cosigned By      Initials Name Provider Type    CK Samira Norman PT Physical Therapist                   Obj/Interventions       Row Name 05/19/24 0901          Range of Motion Comprehensive    General Range of  Motion bilateral lower extremity ROM WFL  -CK       Row Name 05/19/24 0901          Strength Comprehensive (MMT)    General Manual Muscle Testing (MMT) Assessment lower extremity strength deficits identified  -CK     Comment, General Manual Muscle Testing (MMT) Assessment BLE grossly 4/5  -CK       Row Name 05/19/24 0901          Balance    Balance Assessment sitting static balance;standing static balance;standing dynamic balance  -CK     Static Sitting Balance independent  -CK     Position, Sitting Balance unsupported;sitting edge of bed  -CK     Static Standing Balance standby assist  -CK     Dynamic Standing Balance contact guard  -CK     Position/Device Used, Standing Balance unsupported  -CK     Comment, Balance no LOB  -CK       Row Name 05/19/24 0901          Sensory Assessment (Somatosensory)    Sensory Assessment (Somatosensory) LE sensation intact  -CK               User Key  (r) = Recorded By, (t) = Taken By, (c) = Cosigned By      Initials Name Provider Type    CK Samira Norman, PT Physical Therapist                   Goals/Plan       Row Name 05/19/24 0906          Transfer Goal 1 (PT)    Activity/Assistive Device (Transfer Goal 1, PT) sit-to-stand/stand-to-sit;bed-to-chair/chair-to-bed  -CK     [place] Level/Cues Needed (Transfer Goal 1, PT) independent  -CK     Time Frame (Transfer Goal 1, PT) long term goal (LTG);10 days  -CK     Progress/Outcome (Transfer Goal 1, PT) new goal  -CK       Row Name 05/19/24 0906          Gait Training Goal 1 (PT)    Activity/Assistive Device (Gait Training Goal 1, PT) gait (walking locomotion);assistive device use  -CK     [place] Level (Gait Training Goal 1, PT) modified independence  -CK     Time Frame (Gait Training Goal 1, PT) long term goal (LTG);10 days  -CK     Strategies/Barriers (Gait Training Goal 1, PT) 500'  -CK     Progress/Outcome (Gait Training Goal 1, PT) new goal  -CK       Row Name 05/19/24 0906          Therapy Assessment/Plan (PT)     Planned Therapy Interventions (PT) balance training;bed mobility training;gait training;home exercise program;stretching;strengthening;stair training;postural re-education;patient/family education;transfer training;ROM (range of motion)  -CK               User Key  (r) = Recorded By, (t) = Taken By, (c) = Cosigned By      Initials Name Provider Type    CK Samira Norman, PT Physical Therapist                   Clinical Impression       Row Name 05/19/24 0902          Pain    Pretreatment Pain Rating 0/10 - no pain  -CK     Posttreatment Pain Rating 0/10 - no pain  -CK       Row Name 05/19/24 0902          Plan of Care Review    Plan of Care Reviewed With patient  -CK     Outcome Evaluation Patient presents near his functional baseline with mild deficits in balance and strength. He was able to ambulate 350' CGA unsupported without overt LOB. IPPT is indicated to address current deficits. Recommend D/C back to his [facility] when medically appropriate. Recommend trialing [facility]PC next session per patient's baseline.  -CK       Row Name 05/19/24 0902          Therapy Assessment/Plan (PT)    Rehab Potential (PT) good, to achieve stated therapy goals  -CK     Criteria for Skilled Interventions Met (PT) yes;meets criteria  -CK     Therapy Frequency (PT) daily  -CK       Row Name 05/19/24 0902          Vital Signs    Pre Systolic BP Rehab 137  -CK     Pre Treatment Diastolic BP 64  -CK     Pretreatment Heart Rate (beats/min) 74  -CK     Posttreatment Heart Rate (beats/min) 73  -CK     Pre SpO2 (%) 95  -CK     O2 Delivery Pre Treatment room air  -CK     O2 Delivery Intra Treatment room air  -CK     Post SpO2 (%) 95  -CK     O2 Delivery Post Treatment room air  -CK     Pre Patient Position Sitting  -CK     Post Patient Position Sitting  -CK       Row Name 05/19/24 0902          Positioning and Restraints    Pre-Treatment Position in bed  -CK     Post Treatment Position chair  -CK     In Chair reclined;call light within  reach;encouraged to call for assist;exit alarm on;waffle cushion;notified nsg  -CK               User Key  (r) = Recorded By, (t) = Taken By, (c) = Cosigned By      Initials Name Provider Type    Samira Ramirez PT Physical Therapist                   Outcome Measures       Row Name 05/19/24 0910          How much help from another person do you currently need...    Turning from your back to your side while in flat bed without using bedrails? 4  -CK     Moving from lying on back to sitting on the side of a flat bed without bedrails? 4  -CK     Moving to and from a bed to a chair (including a wheelchair)? 4  -CK     Standing up from a chair using your arms (e.g., wheelchair, bedside chair)? 4  -CK     Climbing 3-5 steps with a railing? 3  -CK     To walk in hospital room? 3  -CK     AM-PAC 6 Clicks Score (PT) 22  -CK     Highest Level of Mobility Goal 7 --> Walk 25 feet or more  -CK       Row Name 05/19/24 0910          Functional Assessment    Outcome Measure Options AM-PAC 6 Clicks Basic Mobility (PT)  -CK               User Key  (r) = Recorded By, (t) = Taken By, (c) = Cosigned By      Initials Name Provider Type    Samira Ramirez PT Physical Therapist                                 Physical Therapy Education       Title: PT OT SLP Therapies (In Progress)       Topic: Physical Therapy (In Progress)       Point: Mobility training (Done)       Learning Progress Summary             Patient Acceptance, E, VU by CK at 5/19/2024 0910                         Point: Home exercise program (Not Started)       Learner Progress:  Not documented in this visit.              Point: Body mechanics (Done)       Learning Progress Summary             Patient Acceptance, E, VU by CK at 5/19/2024 0910                         Point: Precautions (Done)       Learning Progress Summary             Patient Acceptance, E, VU by CK at 5/19/2024 0910                                         User Key       Initials Effective  Dates Name Provider Type Discipline    CK 02/06/24 -  Samira Norman PT Physical Therapist PT                  PT Recommendation and Plan  Planned Therapy Interventions (PT): balance training, bed mobility training, gait training, home exercise program, stretching, strengthening, stair training, postural re-education, patient/family education, transfer training, ROM (range of motion)  Plan of Care Reviewed With: patient  Outcome Evaluation: Patient presents near his functional baseline with mild deficits in balance and strength. He was able to ambulate 350' CGA unsupported without overt LOB. IPPT is indicated to address current deficits. Recommend D/C back to his [facility] when medically appropriate. Recommend trialing SPC next session per patient's baseline.     Time Calculation:   PT Evaluation Complexity  History, PT Evaluation Complexity: 1-2 personal factors and/or comorbidities  Examination of Body Systems (PT Eval Complexity): total of 3 or more elements  Clinical Presentation (PT Evaluation Complexity): stable  Clinical Decision Making (PT Evaluation Complexity): low complexity  Overall Complexity (PT Evaluation Complexity): low complexity     PT Charges       Row Name 05/19/24 0912             Time Calculation    Start Time 0829  -CK      PT Received On 05/19/24  -CK      PT Goal Re-Cert Due Date 05/29/24  -CK         Untimed Charges    PT Eval/Re-eval Minutes 46  -CK         Total Minutes    Untimed Charges Total Minutes 46  -CK       Total Minutes 46  -CK                User Key  (r) = Recorded By, (t) = Taken By, (c) = Cosigned By      Initials Name Provider Type    CK Samira Norman PT Physical Therapist                  Therapy Charges for Today       Code Description Service Date Service Provider Modifiers Qty    [id number]  PT EVAL LOW COMPLEXITY 4 5/19/2024 Samira Norman, PT GP 1            PT G-Codes  Outcome Measure Options: AM-PAC 6 Clicks Basic Mobility (PT)  AM-PAC 6 Clicks Score  (PT): 22  PT Discharge Summary  Anticipated Discharge Disposition (PT): assisted living    Samira Norman, PT  5/19/2024

## 2024-05-19 NOTE — PLAN OF CARE
Problem: Adult Inpatient Plan of Care  Goal: Plan of Care Review  Recent Flowsheet Documentation  Taken 5/19/2024 1043 by Sylvia Juan OT  Outcome Evaluation: OT Evaluation completed. Pt presents at or near his occupational baseline. Independent for LB dressing and toileting tasks. Up in room/bathroom with SBA pushing IV pole. Mildly unsteady without LOB. OT will d/c at this time. Recommend home with spouse @ [facility] when pt is medically ready. No DME needs.

## 2024-05-19 NOTE — THERAPY DISCHARGE NOTE
Acute Care - Occupational Therapy Discharge  [facility]    Patient Name: Slick Gomez  : 1945    MRN: [id number]                              Today's Date: 2024       Admit Date: 2024    Visit Dx:     ICD-10-CM ICD-9-CM   1. Acute kidney injury  N17.9 584.9   2. Anemia, unspecified type  D64.9 285.9   3. Nausea and vomiting, unspecified vomiting type  R11.2 787.01   4. Weakness  R53.1 780.79     Patient Active Problem List   Diagnosis    Hydrocele    Intractable nausea and vomiting    Elevated serum creatinine    Scrotum swelling    Normocytic anemia    ZEE (acute kidney injury)     Past Medical History:   Diagnosis Date    Aneurysm     head    Arthritis     Cataract     still present    Headache     Hypertension     Presence of dental bridge     x1- connecting 1 tooth-  dental bridge - bottom right side    Wears glasses      Past Surgical History:   Procedure Laterality Date    CRANIOTOMY FOR ANEURYSM      HYDROCELE EXCISION / REPAIR  24    Bilateral    HYDROCELECTOMY      drainage    HYDROCELECTOMY Bilateral 2024    Procedure: HYDROCELECTOMY BILATERAL;  Surgeon: Christian Mendez MD;  Location: [facility];  Service: Urology;  Laterality: Bilateral;      General Information       Row Name 24 1034          OT Time and Intention    Document Type discharge evaluation/summary  -TB     Mode of Treatment occupational therapy;individual therapy  -TB       Row Name 24 1034          General Information    Patient Profile Reviewed yes  -TB     Prior Level of Function independent:;all household mobility;ADL's;dependent:;cooking  [facility] long[facility]. Pt/spouse goe to dining room for all meals and snacks.  -TB     Existing Precautions/Restrictions other (see comments)  Recent procedure with residual scrotal wound. Baseline dementia.  -TB     Barriers to Rehab none identified  -TB       Row Name 24 1034          Occupational Profile    Reason for Services/Referral (Occupational  Profile) Occupational decline  -TB     Environmental Supports and Barriers (Occupational Profile) Pt lives with his spouse in an apt @ [facility]. Reports independent with all BADLs. SPC at baseline. Dining room for all meals.  -TB       Row Name 05/19/24 1034          Living Environment    People in Home facility resident  -TB       Row Name 05/19/24 1034          Home Main Entrance    Number of Stairs, Main Entrance none  -TB       Row Name 05/19/24 1034          Stairs Within Home, Primary    Number of Stairs, Within Home, Primary none  -TB       Row Name 05/19/24 1034          Cognition    Orientation Status (Cognition) oriented x 4  -TB       Row Name 05/19/24 1034          Safety Issues, Functional Mobility    Safety Issues Affecting Function (Mobility) insight into deficits/self-awareness;safety precaution awareness  -TB     Impairments Affecting Function (Mobility) balance;endurance/activity tolerance;postural/trunk control;strength  -TB     Comment, Safety Issues/Impairments (Mobility) Pt up in room/bathroom with SBA pushing IV pole. Denies dizziness. Mildly unsteady without LOB.  -TB               User Key  (r) = Recorded By, (t) = Taken By, (c) = Cosigned By      Initials Name Provider Type    TB Sylvia Juan OT Occupational Therapist                   Mobility/ADL's       Row Name 05/19/24 1039          Bed Mobility    Comment, (Bed Mobility) UIC  -TB       Row Name 05/19/24 1039          Transfers    Transfers sit-stand transfer;stand-sit transfer;bed-chair transfer;toilet transfer  -TB     Comment, (Transfers) Good sequencing and safety  -TB       Row Name 05/19/24 1039          Bed-Chair Transfer    Bed-Chair [place] (Transfers) standby assist  -TB       Row Name 05/19/24 1039          Sit-Stand Transfer    Sit-Stand [place] (Transfers) standby assist  -TB       Row Name 05/19/24 1039          Stand-Sit Transfer    Stand-Sit [place] (Transfers) standby assist  -TB        Row Name 05/19/24 1039          Toilet Transfer    Type (Toilet Transfer) sit-stand;stand-sit  -TB     [place] Level (Toilet Transfer) standby assist  -TB     Assistive Device (Toilet Transfer) raised toilet seat;grab bars/safety frame  -TB       Row Name 05/19/24 1039          Functional Mobility    Functional Mobility- Ind. Level standby assist  -TB     Functional Mobility- Device --  Pt pushing IV pole  -TB     Functional Mobility-Distance (Feet) 40  -TB     Functional Mobility- Safety Issues balance decreased during turns  -TB     Functional Mobility- Comment Pt is up in room/bathroom with SBA pushing IV pole. Denies dizziness. Mildly unsteady without LOB.  -TB     Patient was able to Ambulate yes  -TB       Row Name 05/19/24 1039          Activities of Daily Living    BADL Assessment/Intervention upper body dressing;lower body dressing;grooming;feeding;toileting  -TB       Row Name 05/19/24 1039          Upper Body Dressing Assessment/Training    [place] Level (Upper Body Dressing) don;pajama/robe;set up  -TB     Position (Upper Body Dressing) unsupported sitting  -TB     Comment, (Upper Body Dressing) Assist for IV lines only  -TB       Row Name 05/19/24 1039          Lower Body Dressing Assessment/Training    [place] Level (Lower Body Dressing) doff;don;socks;independent  -TB     Position (Lower Body Dressing) supported sitting  -TB     Comment, (Lower Body Dressing) Increased time  -TB       Row Name 05/19/24 1039          Grooming Assessment/Training    [place] Level (Grooming) independent  -TB     Comment, (Grooming) to wash/dry hands following toileting  -TB       Row Name 05/19/24 1039          Self-Feeding Assessment/Training    [place] Level (Feeding) independent;liquids to mouth  -TB     Position (Self-Feeding) supported sitting  -TB       Row Name 05/19/24 1039          Toileting Assessment/Training    [place] Level (Toileting) standby assist;adjust/manage clothing   -TB     Position (Toileting) unsupported sitting;supported standing  -TB               User Key  (r) = Recorded By, (t) = Taken By, (c) = Cosigned By      Initials Name Provider Type    Sylvia Nina OT Occupational Therapist                   Obj/Interventions       Row Name 05/19/24 1042          Sensory Assessment (Somatosensory)    Sensory Assessment (Somatosensory) UE sensation intact  -TB       Row Name 05/19/24 1042          Vision Assessment/Intervention    Visual Impairment/Limitations corrective lenses full-time  -TB       Row Name 05/19/24 1042          Range of Motion Comprehensive    General Range of Motion bilateral upper extremity ROM WFL  -TB     Comment, General Range of Motion BUE AROM WFL for self-care performance  -TB       Row Name 05/19/24 1042          Strength Comprehensive (MMT)    Comment, General Manual Muscle Testing (MMT) Assessment Generalized weakness/deconditioned. BUE 4/5  -TB       Row Name 05/19/24 1042          Balance    Balance Assessment sitting dynamic balance;sit to stand dynamic balance;standing dynamic balance  -TB     Dynamic Sitting Balance independent  -TB     Position, Sitting Balance unsupported;sitting in chair;other (see comments)  commode  -TB     Sit to Stand Dynamic Balance standby assist  -TB     Dynamic Standing Balance standby assist  -TB     Position/Device Used, Standing Balance supported  Pt pushing IV pole  -TB     Balance Interventions sitting;standing;sit to stand;supported;dynamic;dynamic reaching;occupation based/functional task;UE activity with balance activity  -TB     Comment, Balance Mildly unsteady without LOB  -TB               User Key  (r) = Recorded By, (t) = Taken By, (c) = Cosigned By      Initials Name Provider Type    Sylvia Nina OT Occupational Therapist                   Goals/Plan    No documentation.                  Clinical Impression       Row Name 05/19/24 1043          Pain Assessment    Pretreatment  Pain Rating 0/10 - no pain  -TB     Posttreatment Pain Rating 0/10 - no pain  -TB     Pre/Posttreatment Pain Comment Pt denies pain with dynamic OOB activity  -TB     Pain Intervention(s) Ambulation/increased activity;Repositioned  -TB       Row Name 05/19/24 1043          Plan of Care Review    Outcome Evaluation OT Evaluation completed. Pt presents at or near his occupational baseline. Independent for LB dressing and toileting tasks. Up in room/bathroom with SBA pushing IV pole. Mildly unsteady without LOB. OT will d/c at this time. Recommend home with spouse @ [facility] when pt is medically ready. No DME needs.  -TB       Row Name 05/19/24 1043          Therapy Assessment/Plan (OT)    Therapy Frequency (OT) evaluation only  -TB       Row Name 05/19/24 1043          Therapy Plan Review/Discharge Plan (OT)    Anticipated Discharge Disposition (OT) assisted living  -TB       Row Name 05/19/24 1043          Vital Signs    Pre Systolic BP Rehab --  RN cleared OT  -TB     Pre SpO2 (%) 98  -TB     O2 Delivery Pre Treatment room air  -TB     Pre Patient Position Sitting  -TB     Intra Patient Position Standing  -TB     Post Patient Position Sitting  -TB       Row Name 05/19/24 1043          Positioning and Restraints    Pre-Treatment Position sitting in chair/recliner  -TB     Post Treatment Position chair  -TB     In Chair notified nsg;reclined;call light within reach;encouraged to call for assist;exit alarm on;legs elevated  -TB               User Key  (r) = Recorded By, (t) = Taken By, (c) = Cosigned By      Initials Name Provider Type    TB Sylvia Juan, OT Occupational Therapist                   Outcome Measures       Row Name 05/19/24 1048          How much help from another is currently needed...    Putting on and taking off regular lower body clothing? 4  -TB     Bathing (including washing, rinsing, and drying) 3  -TB     Toileting (which includes using toilet bed pan or urinal) 4  -TB     Putting on and  taking off regular upper body clothing 4  -TB     Taking care of personal grooming (such as brushing teeth) 4  -TB     Eating meals 4  -TB     AM-PAC 6 Clicks Score (OT) 23  -TB       Row Name 05/19/24 0910          How much help from another person do you currently need...    Turning from your back to your side while in flat bed without using bedrails? 4  -CK     Moving from lying on back to sitting on the side of a flat bed without bedrails? 4  -CK     Moving to and from a bed to a chair (including a wheelchair)? 4  -CK     Standing up from a chair using your arms (e.g., wheelchair, bedside chair)? 4  -CK     Climbing 3-5 steps with a railing? 3  -CK     To walk in hospital room? 3  -CK     AM-PAC 6 Clicks Score (PT) 22  -CK     Highest Level of Mobility Goal 7 --> Walk 25 feet or more  -CK       Row Name 05/19/24 1048 05/19/24 0910       Functional Assessment    Outcome Measure Options AM-PAC 6 Clicks Daily Activity (OT)  -TB AM-PAC 6 Clicks Basic Mobility (PT)  -CK              User Key  (r) = Recorded By, (t) = Taken By, (c) = Cosigned By      Initials Name Provider Type    TB Sylvia Juan, OT Occupational Therapist    CK Samira Norman, PT Physical Therapist                  Occupational Therapy Education       Title: PT OT SLP Therapies (In Progress)       Topic: Occupational Therapy (In Progress)       Point: ADL training (Done)       Description:   Instruct learner(s) on proper safety adaptation and remediation techniques during self care or transfers.   Instruct in proper use of assistive devices.                  Learning Progress Summary             Patient Acceptance, E,D,TB, VU,DU by TB at 5/19/2024 1048                         Point: Home exercise program (Not Started)       Description:   Instruct learner(s) on appropriate technique for monitoring, assisting and/or progressing therapeutic exercises/activities.                  Learner Progress:  Not documented in this visit.               Point: Precautions (Not Started)       Description:   Instruct learner(s) on prescribed precautions during self-care and functional transfers.                  Learner Progress:  Not documented in this visit.              Point: Body mechanics (Not Started)       Description:   Instruct learner(s) on proper positioning and spine alignment during self-care, functional mobility activities and/or exercises.                  Learner Progress:  Not documented in this visit.                              User Key       Initials Effective Dates Name Provider Type Discipline     07/11/23 -  Sylvia Juan, OT Occupational Therapist OT                  OT Recommendation and Plan  Therapy Frequency (OT): evaluation only  Plan of Care Review  Outcome Evaluation: OT Evaluation completed. Pt presents at or near his occupational baseline. Independent for LB dressing and toileting tasks. Up in room/bathroom with SBA pushing IV pole. Mildly unsteady without LOB. OT will d/c at this time. Recommend home with spouse @ [facility] when pt is medically ready. No DME needs.  Outcome Evaluation: OT Evaluation completed. Pt presents at or near his occupational baseline. Independent for LB dressing and toileting tasks. Up in room/bathroom with SBA pushing IV pole. Mildly unsteady without LOB. OT will d/c at this time. Recommend home with spouse @ long-term when pt is medically ready. No DME needs.     Time Calculation:   Evaluation Complexity (OT)  Review Occupational Profile/Medical/Therapy History Complexity: expanded/moderate complexity  Assessment, Occupational Performance/Identification of Deficit Complexity: 3-5 performance deficits  Clinical Decision Making Complexity (OT): detailed assessment/moderate complexity  Overall Complexity of Evaluation (OT): moderate complexity     Time Calculation- OT       Row Name 05/19/24 0926             Time Calculation- OT    OT Start Time 0926  -      OT Received On 05/19/24  -TB         Untimed  Charges    OT Eval/Re-eval Minutes 53  -TB         Total Minutes    Untimed Charges Total Minutes 53  -TB       Total Minutes 53  -TB                User Key  (r) = Recorded By, (t) = Taken By, (c) = Cosigned By      Initials Name Provider Type    TB Sylvia Juan OT Occupational Therapist                  Therapy Charges for Today       Code Description Service Date Service Provider Modifiers Qty    02852099423 HC OT EVAL MOD COMPLEXITY 4 5/19/2024 Sylvia Juan OT GO 1               OT Discharge Summary  Anticipated Discharge Disposition (OT): assisted living    Sylvia Juan OT  5/19/2024

## 2024-05-20 ENCOUNTER — TRANSITIONAL CARE MANAGEMENT TELEPHONE ENCOUNTER (OUTPATIENT)
Dept: CALL CENTER | Facility: HOSPITAL | Age: 79
End: 2024-05-20
Payer: COMMERCIAL

## 2024-05-20 LAB
QT INTERVAL: 382 MS
QTC INTERVAL: 420 MS

## 2024-05-20 NOTE — OUTREACH NOTE
Call Center TCM Note      Flowsheet Row Responses   [facility] patient discharged from? [facility]   Does the patient have one of the following disease processes/diagnoses(primary or secondary)? Other   TCM attempt successful? Yes   Call start time 1041   Call end time 1045   Discharge diagnosis Intractable nausea and vomiting   Is patient permission given to speak with other caregiver? Yes   List who call center can speak with Wife   Person spoke with today (if not patient) and relationship patient and wife   Meds reviewed with patient/caregiver? Yes   Is the patient having any side effects they believe may be caused by any medication additions or changes? No   Does the patient have all medications ordered at discharge? No   Is the patient taking all medications as directed (includes completed medication regime)? Yes   Medication comments States they haven't picked up the zofran but will get it today   Comments Appt made for 5/28 @ 1:15 with Dr. Vogel   Does the patient have an appointment with their PCP within 7-14 days of discharge? No   Nursing Interventions Assisted patient with making appointment per protocol   Psychosocial issues? No   Did the patient receive a copy of their discharge instructions? Yes   Nursing interventions Reviewed instructions with patient   What is the patient's perception of their health status since discharge? Improving   Is the patient/caregiver able to teach back signs and symptoms related to disease process for when to call PCP? Yes   Is the patient/caregiver able to teach back signs and symptoms related to disease process for when to call 911? Yes   Is the patient/caregiver able to teach back the hierarchy of who to call/visit for symptoms/problems? PCP, Specialist, Home health nurse, Urgent Care, ED, 911 Yes   Additional teach back comments Wife states he is doing well and back to himself   TCM call completed? Yes   Wrap up additional comments Appt made with PCP with no other  needs at this time.   Call end time [id number]            Caterina Francois LPN    5/20/2024, 10:47 EDT

## 2024-05-23 LAB
BACTERIA SPEC AEROBE CULT: ABNORMAL
BACTERIA SPEC AEROBE CULT: ABNORMAL
GRAM STN SPEC: ABNORMAL

## 2024-05-28 ENCOUNTER — OFFICE VISIT (OUTPATIENT)
Dept: FAMILY MEDICINE CLINIC | Facility: CLINIC | Age: 79
End: 2024-05-28
Payer: COMMERCIAL

## 2024-05-28 ENCOUNTER — LAB (OUTPATIENT)
Dept: LAB | Facility: HOSPITAL | Age: 79
End: 2024-05-28
Payer: COMMERCIAL

## 2024-05-28 VITALS
BODY MASS INDEX: 31.67 KG/M2 | DIASTOLIC BLOOD PRESSURE: 60 MMHG | SYSTOLIC BLOOD PRESSURE: 118 MMHG | OXYGEN SATURATION: 97 % | HEART RATE: 54 BPM | WEIGHT: 213.8 LBS | HEIGHT: 69 IN

## 2024-05-28 DIAGNOSIS — R94.4 DECREASED CREATININE CLEARANCE: ICD-10-CM

## 2024-05-28 DIAGNOSIS — R53.83 OTHER FATIGUE: ICD-10-CM

## 2024-05-28 DIAGNOSIS — G47.19 EXCESSIVE DAYTIME SLEEPINESS: ICD-10-CM

## 2024-05-28 DIAGNOSIS — R06.83 SNORING: ICD-10-CM

## 2024-05-28 DIAGNOSIS — E66.09 CLASS 1 OBESITY DUE TO EXCESS CALORIES WITH SERIOUS COMORBIDITY AND BODY MASS INDEX (BMI) OF 31.0 TO 31.9 IN ADULT: ICD-10-CM

## 2024-05-28 DIAGNOSIS — N43.3 HYDROCELE, UNSPECIFIED HYDROCELE TYPE: ICD-10-CM

## 2024-05-28 DIAGNOSIS — I10 PRIMARY HYPERTENSION: ICD-10-CM

## 2024-05-28 DIAGNOSIS — I10 PRIMARY HYPERTENSION: Primary | ICD-10-CM

## 2024-05-28 LAB
BASOPHILS # BLD AUTO: 0.03 10*3/MM3 (ref 0–0.2)
BASOPHILS NFR BLD AUTO: 0.4 % (ref 0–1.5)
BILIRUB UR QL STRIP: NEGATIVE
CLARITY UR: CLEAR
COLOR UR: YELLOW
DEPRECATED RDW RBC AUTO: 50.3 FL (ref 37–54)
EOSINOPHIL # BLD AUTO: 0.09 10*3/MM3 (ref 0–0.4)
EOSINOPHIL NFR BLD AUTO: 1.2 % (ref 0.3–6.2)
ERYTHROCYTE [DISTWIDTH] IN BLOOD BY AUTOMATED COUNT: 15.7 % (ref 12.3–15.4)
GLUCOSE UR STRIP-MCNC: NEGATIVE MG/DL
HBA1C MFR BLD: 5 % (ref 4.8–5.6)
HCT VFR BLD AUTO: 30.3 % (ref 37.5–51)
HGB BLD-MCNC: 9.8 G/DL (ref 13–17.7)
HGB UR QL STRIP.AUTO: NEGATIVE
HOLD SPECIMEN: NORMAL
IMM GRANULOCYTES # BLD AUTO: 0.05 10*3/MM3 (ref 0–0.05)
IMM GRANULOCYTES NFR BLD AUTO: 0.7 % (ref 0–0.5)
KETONES UR QL STRIP: NEGATIVE
LEUKOCYTE ESTERASE UR QL STRIP.AUTO: NEGATIVE
LYMPHOCYTES # BLD AUTO: 1.84 10*3/MM3 (ref 0.7–3.1)
LYMPHOCYTES NFR BLD AUTO: 24 % (ref 19.6–45.3)
MCH RBC QN AUTO: 28.8 PG (ref 26.6–33)
MCHC RBC AUTO-ENTMCNC: 32.3 G/DL (ref 31.5–35.7)
MCV RBC AUTO: 89.1 FL (ref 79–97)
MONOCYTES # BLD AUTO: 0.77 10*3/MM3 (ref 0.1–0.9)
MONOCYTES NFR BLD AUTO: 10.1 % (ref 5–12)
NEUTROPHILS NFR BLD AUTO: 4.88 10*3/MM3 (ref 1.7–7)
NEUTROPHILS NFR BLD AUTO: 63.6 % (ref 42.7–76)
NITRITE UR QL STRIP: NEGATIVE
NRBC BLD AUTO-RTO: 0 /100 WBC (ref 0–0.2)
PH UR STRIP.AUTO: 6 [PH] (ref 5–8)
PLATELET # BLD AUTO: 251 10*3/MM3 (ref 140–450)
PMV BLD AUTO: 10.4 FL (ref 6–12)
PROT UR QL STRIP: NEGATIVE
RBC # BLD AUTO: 3.4 10*6/MM3 (ref 4.14–5.8)
SP GR UR STRIP: 1.01 (ref 1–1.03)
T4 FREE SERPL-MCNC: 1.07 NG/DL (ref 0.93–1.7)
TSH SERPL DL<=0.05 MIU/L-ACNC: 2.75 UIU/ML (ref 0.27–4.2)
UROBILINOGEN UR QL STRIP: NORMAL
WBC NRBC COR # BLD AUTO: 7.66 10*3/MM3 (ref 3.4–10.8)

## 2024-05-28 PROCEDURE — 1111F DSCHRG MED/CURRENT MED MERGE: CPT | Performed by: INTERNAL MEDICINE

## 2024-05-28 PROCEDURE — 84439 ASSAY OF FREE THYROXINE: CPT

## 2024-05-28 PROCEDURE — 1126F AMNT PAIN NOTED NONE PRSNT: CPT | Performed by: INTERNAL MEDICINE

## 2024-05-28 PROCEDURE — 85025 COMPLETE CBC W/AUTO DIFF WBC: CPT

## 2024-05-28 PROCEDURE — 99214 OFFICE O/P EST MOD 30 MIN: CPT | Performed by: INTERNAL MEDICINE

## 2024-05-28 PROCEDURE — 80053 COMPREHEN METABOLIC PANEL: CPT

## 2024-05-28 PROCEDURE — 84443 ASSAY THYROID STIM HORMONE: CPT

## 2024-05-28 PROCEDURE — 1160F RVW MEDS BY RX/DR IN RCRD: CPT | Performed by: INTERNAL MEDICINE

## 2024-05-28 PROCEDURE — 83036 HEMOGLOBIN GLYCOSYLATED A1C: CPT

## 2024-05-28 PROCEDURE — 81003 URINALYSIS AUTO W/O SCOPE: CPT

## 2024-05-28 PROCEDURE — 1159F MED LIST DOCD IN RCRD: CPT | Performed by: INTERNAL MEDICINE

## 2024-05-28 NOTE — PROGRESS NOTES
Chief Complaint   Patient presents with    Hospital Follow Up Visit     Admitted to [facility] ED 2024, discharged 2024       HPI:  Slick Gomez is a 78 y.o. male who presents today for follow-up GI illness.  No acute concerns.  Appetite is back to normal.  His wife would like to discuss excessive daytime sleepiness.    ROS:  Constitutional: no fevers, night sweats or unexplained weight loss  Eyes: no vision changes  ENT: no runny nose, ear pain, sore throat  Cardio: no chest pain, palpitations  Pulm: no shortness of breath, wheezing, or cough  GI: no abdominal pain or changes in bowel movements  : no difficulty urinating  MSK: no difficulty ambulating, no joint pain  Neuro: no weakness, dizziness or headache  Psych: no trouble sleeping  Endo: no change in appetite      Past Medical History:   Diagnosis Date    Aneurysm     head    Arthritis     Cataract     still present    Headache     HL (hearing loss)     Unknown    Hypertension     Presence of dental bridge     x1- connecting 1 tooth-  dental bridge - bottom right side    Stroke     Aneurysm rupture    Wears glasses       History reviewed. No pertinent family history.   Social History     Socioeconomic History    Marital status:    Tobacco Use    Smoking status: Former     Current packs/day: 0.00     Types: Cigarettes     Quit date:      Years since quittin.4     Passive exposure: Past    Smokeless tobacco: Never   Vaping Use    Vaping status: Never Used   Substance and Sexual Activity    Alcohol use: Not Currently    Drug use: Never    Sexual activity: Not Currently     Partners: Female     Birth control/protection: Abstinence      Allergies   Allergen Reactions    Penicillins Hives     Has tolerated Cefazolin in the past        There is no immunization history on file for this patient.     PE:  Vitals:    24 1317   BP: 118/60   Pulse: 54   SpO2: 97%      Body mass index is 31.56 kg/m².    Gen Appearance:  NAD  HEENT: Normocephalic, PERRLA, no thyromegaly, trache midline  Heart: RRR, normal S1 and S2, no murmur  Lungs: CTA b/l, no wheezing, no crackles  Abdomen: Soft, non-tender, non-distended, no guarding and BSx4  MSK: Moves all extremities well, normal gait, no peripheral edema  Pulses: Palpable and equal b/l  Lymph nodes: No palpable lymphadenopathy   Neuro: No focal deficits      Current Outpatient Medications   Medication Sig Dispense Refill    acetaminophen (TYLENOL) 325 MG tablet Take 2 tablets by mouth Every 6 (Six) Hours As Needed for Mild Pain.      aspirin (aspirin EC) 81 MG EC tablet Take 1 tablet by mouth Daily.      Calcium Carb-Cholecalciferol (Oyster Shell Calcium/D) 500-10 MG-MCG tablet tablet Take  by mouth Daily.      cetirizine (zyrTEC) 10 MG tablet Take 1 tablet by mouth Daily.      desmopressin (DDAVP) 0.2 MG tablet Take 1 tablet by mouth Daily.      donepezil (ARICEPT) 10 MG tablet Take 1 tablet by mouth Every Night.      escitalopram (LEXAPRO) 10 MG tablet Take 1 tablet by mouth Daily.      finasteride (PROSCAR) 5 MG tablet Take 1 tablet by mouth Daily.      memantine (Namenda) 10 MG tablet Take 1 tablet by mouth 2 (Two) Times a Day. 60 tablet 11    NIFEdipine XL (PROCARDIA XL) 60 MG 24 hr tablet Take 1 tablet by mouth Daily.      Omega-3 Fatty Acids (fish oil) 1000 MG capsule capsule Take  by mouth 2 (Two) Times a Day With Meals.      ondansetron ODT (ZOFRAN-ODT) 4 MG disintegrating tablet Take 1 tablet by mouth Every 6 (Six) Hours As Needed for Nausea or Vomiting. 30 tablet 0    risperiDONE (risperDAL) 0.25 MG tablet Take 1 tablet by mouth 2 (Two) Times a Day.      tamsulosin (FLOMAX) 0.4 MG capsule 24 hr capsule Take 1 capsule by mouth Daily.       No current facility-administered medications for this visit.      Recheck labs today.  Patient's wife has concerns for obstructive sleep apnea.  Excessive daytime sleepiness, loud snoring and several naps per day.    Bp well controlled.      Current outpatient and discharge medications have been reconciled for the patient.  Reviewed by: Clayton Vogel DO    Diagnoses and all orders for this visit:    1. Primary hypertension (Primary)  -     CBC & Differential; Future  -     Comprehensive Metabolic Panel; Future  -     Hemoglobin A1c; Future  -     TSH+Free T4; Future  -     Urinalysis With Culture If Indicated - Urine, Clean Catch; Future    2. Hydrocele, unspecified hydrocele type  -     CBC & Differential; Future  -     Comprehensive Metabolic Panel; Future  -     Hemoglobin A1c; Future  -     TSH+Free T4; Future  -     Urinalysis With Culture If Indicated - Urine, Clean Catch; Future    3. Decreased creatinine clearance  -     CBC & Differential; Future  -     Comprehensive Metabolic Panel; Future  -     Hemoglobin A1c; Future  -     TSH+Free T4; Future  -     Urinalysis With Culture If Indicated - Urine, Clean Catch; Future    4. Excessive daytime sleepiness  -     CBC & Differential; Future  -     Comprehensive Metabolic Panel; Future  -     Hemoglobin A1c; Future  -     TSH+Free T4; Future  -     Urinalysis With Culture If Indicated - Urine, Clean Catch; Future  -     Home Sleep Study; Future    5. Snoring  -     CBC & Differential; Future  -     Comprehensive Metabolic Panel; Future  -     Hemoglobin A1c; Future  -     TSH+Free T4; Future  -     Urinalysis With Culture If Indicated - Urine, Clean Catch; Future  -     Home Sleep Study; Future    6. Other fatigue  -     CBC & Differential; Future  -     Comprehensive Metabolic Panel; Future  -     Hemoglobin A1c; Future  -     TSH+Free T4; Future  -     Urinalysis With Culture If Indicated - Urine, Clean Catch; Future    7. Class 1 obesity due to excess calories with serious comorbidity and body mass index (BMI) of 31.0 to 31.9 in adult  -     Home Sleep Study; Future         No follow-ups on file.     Dictated Utilizing Dragon Dictation    Please note that portions of this note were  completed with a voice recognition program.    Part of this note may be an electronic transcription/translation of spoken language to printed text using the Dragon Dictation System.

## 2024-05-29 LAB
ALBUMIN SERPL-MCNC: 3.3 G/DL (ref 3.5–5.2)
ALBUMIN/GLOB SERPL: 0.7 G/DL
ALP SERPL-CCNC: 96 U/L (ref 39–117)
ALT SERPL W P-5'-P-CCNC: 14 U/L (ref 1–41)
ANION GAP SERPL CALCULATED.3IONS-SCNC: 8.9 MMOL/L (ref 5–15)
AST SERPL-CCNC: 26 U/L (ref 1–40)
BILIRUB SERPL-MCNC: 0.4 MG/DL (ref 0–1.2)
BUN SERPL-MCNC: 13 MG/DL (ref 8–23)
BUN/CREAT SERPL: 8 (ref 7–25)
CALCIUM SPEC-SCNC: 9.2 MG/DL (ref 8.6–10.5)
CHLORIDE SERPL-SCNC: 98 MMOL/L (ref 98–107)
CO2 SERPL-SCNC: 22.1 MMOL/L (ref 22–29)
CREAT SERPL-MCNC: 1.62 MG/DL (ref 0.76–1.27)
EGFRCR SERPLBLD CKD-EPI 2021: 43.2 ML/MIN/1.73
GLOBULIN UR ELPH-MCNC: 4.7 GM/DL
GLUCOSE SERPL-MCNC: 101 MG/DL (ref 65–99)
POTASSIUM SERPL-SCNC: 5 MMOL/L (ref 3.5–5.2)
PROT SERPL-MCNC: 8 G/DL (ref 6–8.5)
SODIUM SERPL-SCNC: 129 MMOL/L (ref 136–145)

## 2024-05-31 ENCOUNTER — OFFICE VISIT (OUTPATIENT)
Dept: UROLOGY | Facility: CLINIC | Age: 79
End: 2024-05-31
Payer: COMMERCIAL

## 2024-05-31 VITALS — WEIGHT: 212 LBS | BODY MASS INDEX: 31.4 KG/M2 | HEIGHT: 69 IN

## 2024-05-31 DIAGNOSIS — N43.3 HYDROCELE, UNSPECIFIED HYDROCELE TYPE: Primary | ICD-10-CM

## 2024-05-31 PROCEDURE — 99024 POSTOP FOLLOW-UP VISIT: CPT | Performed by: UROLOGY

## 2024-05-31 NOTE — PROGRESS NOTES
Follow Up Office Visit      Patient Name: Slick Gomez  : 1945   MRN: [id number]     Chief Complaint:    Chief Complaint   Patient presents with    Hydrocele, unspecified hydrocele type       History of Present Illness: Slick Gomez is a 78 y.o. male who presents today for follow up of post-operative wound check. He is s/p Bilateral Hydrocelectomy on 24 with Dr. Mendez. He presents with his wife today who helps to provide some of the history. He was seen in office  for incision site/wound check. He initially had breakdown of the left scrotal incision for which he completed a course of Levaquin. He has continued to perform local wound care with gunehemiah. There were not signs of persistent infection when he was last seen.      Today he is feeling well. He denies dysuria, scrotal pain, worsening scrotal swelling, fever or chills. He does complain of central, lower back pain.    Subjective      Review of System: Review of Systems   Genitourinary:  Negative for decreased urine volume, difficulty urinating, dysuria, enuresis, flank pain, frequency, hematuria and urgency.      I have reviewed the ROS documented by my clinical staff, updated as appropriate and I agree. Christian Mendez MD    I have reviewed and the following portions of the patient's history were updated as appropriate: past family history, past medical history, past social history, past surgical history and problem list.    Medications:     Current Outpatient Medications:     acetaminophen (TYLENOL) 325 MG tablet, Take 2 tablets by mouth Every 6 (Six) Hours As Needed for Mild Pain., Disp: , Rfl:     aspirin (aspirin EC) 81 MG EC tablet, Take 1 tablet by mouth Daily., Disp: , Rfl:     Calcium Carb-Cholecalciferol (Oyster Shell Calcium/D) 500-10 MG-MCG tablet tablet, Take  by mouth Daily., Disp: , Rfl:     cetirizine (zyrTEC) 10 MG tablet, Take 1 tablet by mouth Daily., Disp: , Rfl:     desmopressin (DDAVP) 0.2 MG tablet,  "Take 1 tablet by mouth Daily., Disp: , Rfl:     donepezil (ARICEPT) 10 MG tablet, Take 1 tablet by mouth Every Night., Disp: , Rfl:     escitalopram (LEXAPRO) 10 MG tablet, Take 1 tablet by mouth Daily., Disp: , Rfl:     finasteride (PROSCAR) 5 MG tablet, Take 1 tablet by mouth Daily., Disp: , Rfl:     memantine (Namenda) 10 MG tablet, Take 1 tablet by mouth 2 (Two) Times a Day., Disp: 60 tablet, Rfl: 11    NIFEdipine XL (PROCARDIA XL) 60 MG 24 hr tablet, Take 1 tablet by mouth Daily., Disp: , Rfl:     Omega-3 Fatty Acids (fish oil) 1000 MG capsule capsule, Take  by mouth 2 (Two) Times a Day With Meals., Disp: , Rfl:     ondansetron ODT (ZOFRAN-ODT) 4 MG disintegrating tablet, Take 1 tablet by mouth Every 6 (Six) Hours As Needed for Nausea or Vomiting., Disp: 30 tablet, Rfl: 0    risperiDONE (risperDAL) 0.25 MG tablet, Take 1 tablet by mouth 2 (Two) Times a Day., Disp: , Rfl:     tamsulosin (FLOMAX) 0.4 MG capsule 24 hr capsule, Take 1 capsule by mouth Daily., Disp: , Rfl:     Allergies:   Allergies   Allergen Reactions    Penicillins Hives     Has tolerated Cefazolin in the past       Objective     Physical Exam:   Vital Signs:   Vitals:    05/31/24 1038   Weight: 96.2 kg (212 lb)   Height: 175.3 cm (69.02\")   PainSc:   2   PainLoc: Back     Body mass index is 31.29 kg/m².     Physical Exam    Physical Exam  Vitals and nursing note reviewed.   Constitutional:       Appearance: Normal appearance.   HENT:      Head: Normocephalic and atraumatic.      Nose: Nose normal.      Mouth/Throat:      Mouth: Mucous membranes are moist.   Eyes:      Pupils: Pupils are equal, round, and reactive to light.   Pulmonary:      Effort: Pulmonary effort is normal.   Abdominal:      General: Abdomen is flat.      Palpations: Abdomen is soft.   Genitourinary:     Comments: Right hemiscrotal incision site is clean, dry, intact. No evidence of incision site dehiscence. There is moderate right hemiscrotal swelling, normal post-operative " swelling improved from prior exam.     Left hemiscrotal incision site is dehisced < 0.25 inch. Wound depth has improved from last office visit There is mild serous drainage. No evidence of pus draining from incision site. There is no significant erythema. He is non tender to palpation.      Musculoskeletal:         General: Normal range of motion.      Cervical back: Normal range of motion.   Skin:     General: Skin is warm and dry.      Capillary Refill: Capillary refill takes less than 2 seconds.   Neurological:      General: No focal deficit present.      Mental Status: He is alert.   Psychiatric:         Mood and Affect: Mood normal.     Labs:   Brief Urine Lab Results  (Last result in the past 365 days)        Color   Clarity   Blood   Leuk Est   Nitrite   Protein   CREAT   Urine HCG        05/28/24 1405 Yellow   Clear   Negative   Negative   Negative   Negative                        Lab Results   Component Value Date    GLUCOSE 101 (H) 05/28/2024    CALCIUM 9.2 05/28/2024     (L) 05/28/2024    K 5.0 05/28/2024    CO2 22.1 05/28/2024    CL 98 05/28/2024    BUN 13 05/28/2024    CREATININE 1.62 (H) 05/28/2024    BCR 8.0 05/28/2024    ANIONGAP 8.9 05/28/2024       Lab Results   Component Value Date    WBC 7.66 05/28/2024    HGB 9.8 (L) 05/28/2024    HCT 30.3 (L) 05/28/2024    MCV 89.1 05/28/2024     05/28/2024       Images:   XR Abdomen 2+ VW with Chest 1 VW    Result Date: 5/18/2024  Impression: 1.No acute pulmonary process. 2.Nonspecific bowel gas pattern. Electronically Signed: Diomedes Lowry MD  5/18/2024 12:19 PM EDT  Workstation ID: [id number]    CT Abdomen Pelvis Without Contrast    Result Date: 5/18/2024  No acute abdominal or pelvic pathology. Electronically Signed: Harjit Don MD  5/18/2024 12:02 PM EDT  Workstation ID: [id number]    US Testicular or Ovarian Vascular Limited    Result Date: 5/4/2024  1.Preserved blood flow within the bilateral testicles. 2.Large, complex/septated bilateral  hydroceles. These could represent hematoceles, spermatoceles, or pyoceles. Please correlate clinically. 3.Bilateral scrotal skin thickening. Please correlate clinically for scrotal cellulitis. Electronically Signed: Daniel Cheng MD  5/4/2024 1:04 PM EDT  Workstation ID: [id number]    US Scrotum & Testicles    Result Date: 5/4/2024  1.Preserved blood flow within the bilateral testicles. 2.Large, complex/septated bilateral hydroceles. These could represent hematoceles, spermatoceles, or pyoceles. Please correlate clinically. 3.Bilateral scrotal skin thickening. Please correlate clinically for scrotal cellulitis. Electronically Signed: Daniel Cheng MD  5/4/2024 1:04 PM EDT  Workstation ID: [id number]    US Scrotum & Testicles    Result Date: 2/21/2024  Impression: 1.Large bilateral hydroceles. Electronically Signed: Navneet Mazariegos MD  2/21/2024 4:06 PM EST  Workstation ID: [id number]      Measures:   Tobacco:   Slick Gomez  reports that he quit smoking about 12 years ago. His smoking use included cigarettes. He has been exposed to tobacco smoke. He has never used smokeless tobacco. I have educated him on the risk of diseases from using tobacco products such as cancer, COPD, and heart disease.     Urine Incontinence: ( NOUI)     Assessment / Plan      Assessment/Plan:   78 y.o. male who presented today for follow up of scrotal wound check.      Left hemiscrotal wound is nearly resolved. No evidence of pus, significant induration or erythema. He is NON tender to palpation of left hemiscrotum. He denies fever/chills. Encouraged to continue washing incision site daily with soap and water and continue local wound care with gauze as needed to keep his underwear clean. We discussed that the swelling should continue to improve. We recommended discussing his back pain which appears musculoskeletal in nature with his PCP. Follow up in 1 month with Dr. Mendez for wound/symptom check.     Diagnoses and all orders for this  visit:    1. Hydrocele, unspecified hydrocele type (Primary)         Follow Up:   Return in about 4 weeks (around 6/28/2024) for Recheck.     I spent approximately 25 minutes providing clinical care for this patient; including review of patient's chart and provider documentation, face to face time spent with patient in examination room (obtaining history, performing physical exam, discussing diagnosis and management options), placing orders, and completing patient documentation.     Christian Mendez MD  [facility] [place]

## 2024-06-05 DIAGNOSIS — E87.1 HYPONATREMIA: Primary | ICD-10-CM

## 2024-06-27 LAB
QT INTERVAL: 382 MS
QTC INTERVAL: 420 MS

## 2024-07-12 ENCOUNTER — OFFICE VISIT (OUTPATIENT)
Dept: UROLOGY | Facility: CLINIC | Age: 79
End: 2024-07-12
Payer: MEDICARE

## 2024-07-12 DIAGNOSIS — N43.3 HYDROCELE, UNSPECIFIED HYDROCELE TYPE: Primary | ICD-10-CM

## 2024-07-12 NOTE — PROGRESS NOTES
Follow Up Office Visit      Patient Name: Slick Gomez  : 1945   MRN: [id number]     Chief Complaint:    Chief Complaint   Patient presents with    Hydrocele, unspecified hydrocele type       History of Present Illness: Slick Gomez is a 78 y.o. male who presents today for follow up of scrotal wound check.    He is s/p Bilateral Hydrocelectomy on 24 with Dr. Mendez. He presents with his wife today who helps to provide some of the history. He was seen in office  for incision site/wound check. He initially had breakdown of the left scrotal incision for which he completed a course of Levaquin.  He continues to pack the wound and has since run out of gauze.  The wound has subsequently filled in.    Subjective      Review of System: Review of Systems   Genitourinary:  Negative for decreased urine volume, difficulty urinating, dysuria, enuresis, flank pain, frequency, hematuria and urgency.      I have reviewed the ROS documented by my clinical staff, updated as appropriate and I agree. Christian Mendez MD    I have reviewed and the following portions of the patient's history were updated as appropriate: past family history, past medical history, past social history, past surgical history and problem list.    Medications:     Current Outpatient Medications:     acetaminophen (TYLENOL) 325 MG tablet, Take 2 tablets by mouth Every 6 (Six) Hours As Needed for Mild Pain., Disp: , Rfl:     aspirin (aspirin EC) 81 MG EC tablet, Take 1 tablet by mouth Daily., Disp: , Rfl:     Calcium Carb-Cholecalciferol (Oyster Shell Calcium/D) 500-10 MG-MCG tablet tablet, Take  by mouth Daily., Disp: , Rfl:     cetirizine (zyrTEC) 10 MG tablet, Take 1 tablet by mouth Daily., Disp: , Rfl:     desmopressin (DDAVP) 0.2 MG tablet, Take 1 tablet by mouth Daily., Disp: , Rfl:     donepezil (ARICEPT) 10 MG tablet, Take 1 tablet by mouth Every Night., Disp: , Rfl:     escitalopram (LEXAPRO) 10 MG tablet, Take 1  tablet by mouth Daily., Disp: , Rfl:     finasteride (PROSCAR) 5 MG tablet, Take 1 tablet by mouth Daily., Disp: , Rfl:     memantine (Namenda) 10 MG tablet, Take 1 tablet by mouth 2 (Two) Times a Day., Disp: 60 tablet, Rfl: 11    NIFEdipine XL (PROCARDIA XL) 60 MG 24 hr tablet, Take 1 tablet by mouth Daily., Disp: , Rfl:     Omega-3 Fatty Acids (fish oil) 1000 MG capsule capsule, Take  by mouth 2 (Two) Times a Day With Meals., Disp: , Rfl:     ondansetron ODT (ZOFRAN-ODT) 4 MG disintegrating tablet, Take 1 tablet by mouth Every 6 (Six) Hours As Needed for Nausea or Vomiting., Disp: 30 tablet, Rfl: 0    risperiDONE (risperDAL) 0.25 MG tablet, Take 1 tablet by mouth 2 (Two) Times a Day., Disp: , Rfl:     tamsulosin (FLOMAX) 0.4 MG capsule 24 hr capsule, Take 1 capsule by mouth Daily., Disp: , Rfl:     Allergies:   Allergies   Allergen Reactions    Penicillins Hives     Has tolerated Cefazolin in the past         Objective     Physical Exam:   Vital Signs: There were no vitals filed for this visit.  There is no height or weight on file to calculate BMI.     Physical Exam  Constitutional:       Appearance: Normal appearance.   HENT:      Head: Normocephalic and atraumatic.   Eyes:      Pupils: Pupils are equal, round, and reactive to light.   Abdominal:      General: Abdomen is flat.      Palpations: Abdomen is soft.   Genitourinary:     Comments: Normal-appearing uncircumcised phallus.  Right hemiscrotum with transverse incision well-healed.  Left hemiscrotum with some mild swelling and incision healed with some residual tissue emanating out the lateral aspect.  There is some firmness deep to the incision without fluctuance or tenderness.  Musculoskeletal:         General: Normal range of motion.   Skin:     General: Skin is warm and dry.   Neurological:      General: No focal deficit present.      Mental Status: He is alert.   Psychiatric:         Mood and Affect: Mood normal.         Behavior: Behavior normal.          Labs:   Brief Urine Lab Results  (Last result in the past 365 days)        Color   Clarity   Blood   Leuk Est   Nitrite   Protein   CREAT   Urine HCG        05/28/24 1405 Yellow   Clear   Negative   Negative   Negative   Negative                        Lab Results   Component Value Date    GLUCOSE 101 (H) 05/28/2024    CALCIUM 9.2 05/28/2024     (L) 05/28/2024    K 5.0 05/28/2024    CO2 22.1 05/28/2024    CL 98 05/28/2024    BUN 13 05/28/2024    CREATININE 1.62 (H) 05/28/2024    BCR 8.0 05/28/2024    ANIONGAP 8.9 05/28/2024       Lab Results   Component Value Date    WBC 7.66 05/28/2024    HGB 9.8 (L) 05/28/2024    HCT 30.3 (L) 05/28/2024    MCV 89.1 05/28/2024     05/28/2024       Images:   XR Abdomen 2+ VW with Chest 1 VW    Result Date: 5/18/2024  Impression: 1.No acute pulmonary process. 2.Nonspecific bowel gas pattern. Electronically Signed: Diomedes Lowry MD  5/18/2024 12:19 PM EDT  Workstation ID: [id number]    CT Abdomen Pelvis Without Contrast    Result Date: 5/18/2024  No acute abdominal or pelvic pathology. Electronically Signed: Harjit Don MD  5/18/2024 12:02 PM EDT  Workstation ID: [id number]    US Testicular or Ovarian Vascular Limited    Result Date: 5/4/2024  1.Preserved blood flow within the bilateral testicles. 2.Large, complex/septated bilateral hydroceles. These could represent hematoceles, spermatoceles, or pyoceles. Please correlate clinically. 3.Bilateral scrotal skin thickening. Please correlate clinically for scrotal cellulitis. Electronically Signed: Daniel Cheng MD  5/4/2024 1:04 PM EDT  Workstation ID: [id number]    US Scrotum & Testicles    Result Date: 5/4/2024  1.Preserved blood flow within the bilateral testicles. 2.Large, complex/septated bilateral hydroceles. These could represent hematoceles, spermatoceles, or pyoceles. Please correlate clinically. 3.Bilateral scrotal skin thickening. Please correlate clinically for scrotal cellulitis. Electronically Signed:  Daniel Cheng MD  5/4/2024 1:04 PM EDT  Workstation ID: [id number]      Measures:   Tobacco:   Slick Gomez  reports that he quit smoking about 12 years ago. His smoking use included cigarettes. He has been exposed to tobacco smoke. He has never used smokeless tobacco. I have educated him on the risk of diseases from using tobacco products such as cancer, COPD, and heart disease.         Urine Incontinence: Patient denies urinary incontinence.  Assessment / Plan      Assessment/Plan:   78 y.o. male is seen today for follow up of scrotal wound checked after bilateral hydrocelectomy.  He had a small superficial wound dehiscence of the left scrotal incision.  He was packing this with some dry gauze and this has subsequently healed then.  He is overall happy with the results after his hydrocelectomy.  Patient will continue to conservatively monitor, follow-up in 6 months for symptom check.    Diagnoses and all orders for this visit:    1. Hydrocele, unspecified hydrocele type (Primary)         Follow Up:   Return in about 6 months (around 1/12/2025).     I spent approximately 20 minutes providing clinical care for this patient; including review of patient's chart and provider documentation, face to face time spent with patient in examination room (obtaining history, performing physical exam, discussing diagnosis and management options), placing orders, and completing patient documentation.     Christian Mendez MD  [facility] [place]

## 2024-08-13 ENCOUNTER — OFFICE VISIT (OUTPATIENT)
Dept: FAMILY MEDICINE CLINIC | Facility: CLINIC | Age: 79
End: 2024-08-13
Payer: MEDICARE

## 2024-08-13 VITALS
BODY MASS INDEX: 31.99 KG/M2 | HEART RATE: 64 BPM | OXYGEN SATURATION: 97 % | DIASTOLIC BLOOD PRESSURE: 68 MMHG | SYSTOLIC BLOOD PRESSURE: 118 MMHG | WEIGHT: 216 LBS | HEIGHT: 69 IN

## 2024-08-13 DIAGNOSIS — I10 PRIMARY HYPERTENSION: Primary | ICD-10-CM

## 2024-08-13 DIAGNOSIS — N18.30 STAGE 3 CHRONIC KIDNEY DISEASE, UNSPECIFIED WHETHER STAGE 3A OR 3B CKD: ICD-10-CM

## 2024-08-13 DIAGNOSIS — N40.1 BENIGN PROSTATIC HYPERPLASIA WITH URINARY FREQUENCY: ICD-10-CM

## 2024-08-13 DIAGNOSIS — R35.0 BENIGN PROSTATIC HYPERPLASIA WITH URINARY FREQUENCY: ICD-10-CM

## 2024-08-13 DIAGNOSIS — E87.1 HYPONATREMIA: ICD-10-CM

## 2024-08-13 DIAGNOSIS — D64.9 ANEMIA, UNSPECIFIED TYPE: ICD-10-CM

## 2024-08-13 DIAGNOSIS — Z86.79 HISTORY OF CEREBRAL ANEURYSM: ICD-10-CM

## 2024-08-13 DIAGNOSIS — R41.89 COGNITIVE IMPAIRMENT: ICD-10-CM

## 2024-08-13 PROCEDURE — 1125F AMNT PAIN NOTED PAIN PRSNT: CPT | Performed by: INTERNAL MEDICINE

## 2024-08-13 PROCEDURE — 99214 OFFICE O/P EST MOD 30 MIN: CPT | Performed by: INTERNAL MEDICINE

## 2024-08-13 NOTE — PROGRESS NOTES
Chief Complaint   Patient presents with    Hypertension     Follow up        HPI:  Slick Gomez is a 79 y.o. male who presents today for follow-up hypertension.  No acute concerns today.  He is accompanied by his wife.  He is interested in switching to [facility] due to location of his house.  It is difficult for them to travel.    ROS:  Constitutional: no fevers, night sweats or unexplained weight loss  Eyes: no vision changes  ENT: no runny nose, ear pain, sore throat  Cardio: no chest pain, palpitations  Pulm: no shortness of breath, wheezing, or cough  GI: no abdominal pain or changes in bowel movements  : no difficulty urinating  MSK: no difficulty ambulating, no joint pain  Neuro: no weakness, dizziness or headache  Psych: no trouble sleeping  Endo: no change in appetite      Past Medical History:   Diagnosis Date    Aneurysm     head    Arthritis     Cataract     still present    Headache     HL (hearing loss)     Unknown    Hypertension     Presence of dental bridge     x1- connecting 1 tooth-  dental bridge - bottom right side    Stage 3 chronic kidney disease 2024    Stroke     Aneurysm rupture    Wears glasses       History reviewed. No pertinent family history.   Social History     Socioeconomic History    Marital status:    Tobacco Use    Smoking status: Former     Current packs/day: 0.00     Types: Cigarettes     Quit date:      Years since quittin.6     Passive exposure: Past    Smokeless tobacco: Never   Vaping Use    Vaping status: Never Used   Substance and Sexual Activity    Alcohol use: Not Currently    Drug use: Never    Sexual activity: Not Currently     Partners: Female     Birth control/protection: Abstinence      Allergies   Allergen Reactions    Penicillins Hives     Has tolerated Cefazolin in the past        There is no immunization history on file for this patient.     PE:  Vitals:    24 1246   BP: 118/68   Pulse: 64   SpO2: 97%      Body  mass index is 31.88 kg/m².    Gen Appearance: NAD  HEENT: Normocephalic, PERRLA, no thyromegaly, trache midline  Heart: RRR, normal S1 and S2, no murmur  Lungs: CTA b/l, no wheezing, no crackles  Abdomen: Soft, non-tender, non-distended, no guarding and BSx4  MSK: Moves all extremities well, normal gait, no peripheral edema  Pulses: Palpable and equal b/l  Lymph nodes: No palpable lymphadenopathy   Neuro: No focal deficits      Current Outpatient Medications   Medication Sig Dispense Refill    acetaminophen (TYLENOL) 325 MG tablet Take 2 tablets by mouth Every 6 (Six) Hours As Needed for Mild Pain.      aspirin (aspirin EC) 81 MG EC tablet Take 1 tablet by mouth Daily.      Calcium Carb-Cholecalciferol (Oyster Shell Calcium/D) 500-10 MG-MCG tablet tablet Take  by mouth Daily.      cetirizine (zyrTEC) 10 MG tablet Take 1 tablet by mouth Daily.      desmopressin (DDAVP) 0.2 MG tablet Take 1 tablet by mouth Daily.      donepezil (ARICEPT) 10 MG tablet Take 1 tablet by mouth Every Night.      escitalopram (LEXAPRO) 10 MG tablet Take 1 tablet by mouth Daily.      finasteride (PROSCAR) 5 MG tablet Take 1 tablet by mouth Daily.      memantine (Namenda) 10 MG tablet Take 1 tablet by mouth 2 (Two) Times a Day. 60 tablet 11    NIFEdipine XL (PROCARDIA XL) 60 MG 24 hr tablet Take 1 tablet by mouth Daily.      Omega-3 Fatty Acids (fish oil) 1000 MG capsule capsule Take  by mouth 2 (Two) Times a Day With Meals.      ondansetron ODT (ZOFRAN-ODT) 4 MG disintegrating tablet Take 1 tablet by mouth Every 6 (Six) Hours As Needed for Nausea or Vomiting. 30 tablet 0    risperiDONE (risperDAL) 0.25 MG tablet Take 1 tablet by mouth 2 (Two) Times a Day.      tamsulosin (FLOMAX) 0.4 MG capsule 24 hr capsule Take 1 capsule by mouth Daily.       No current facility-administered medications for this visit.          Diagnoses and all orders for this visit:    1. Primary hypertension (Primary)  Well-controlled on nifedipine, cont.   2. Anemia,  unspecified type  -     Vitamin B12; Future  -     Folate; Future  -     Ferritin; Future  -     Iron Profile; Future  -     Peripheral Blood Smear; Future  Possibly related to CKD.  Rechecking labs today.  3. Hyponatremia  Labs ordered today to further workup hyponatremia.  4. Benign prostatic hyperplasia with urinary frequency  Flomax/finasteride.  5. Cognitive impairment  Continue Namenda and Aricept.  Not currently established with neurology.  Symptoms stable.  6. Stage 3 chronic kidney disease, unspecified whether stage 3a or 3b CKD  Would recommend establishing care with nephrology, unsure if he is currently taking desmopressin, possibly for cerebral aneurysm versus hyponatremia.  Patient is poor historian.  Will need prior records.  7. History of cerebral aneurysm  See above.       No follow-ups on file.     Dictated Utilizing Dragon Dictation    Please note that portions of this note were completed with a voice recognition program.    Part of this note may be an electronic transcription/translation of spoken language to printed text using the Dragon Dictation System.

## 2024-09-23 DIAGNOSIS — G47.19 EXCESSIVE DAYTIME SLEEPINESS: Primary | ICD-10-CM

## 2024-09-23 DIAGNOSIS — R06.83 SNORING: ICD-10-CM

## 2024-10-15 ENCOUNTER — TELEPHONE (OUTPATIENT)
Dept: FAMILY MEDICINE CLINIC | Facility: CLINIC | Age: 79
End: 2024-10-15
Payer: COMMERCIAL

## (undated) DEVICE — UNDERGLV SURG BIOGEL INDICAT PF 71/2 GRN

## (undated) DEVICE — SUT GUT CHRM 3/0 SH 27IN G122H

## (undated) DEVICE — TBG PENCL TELESCP MEGADYNE SMOKE EVAC 10FT

## (undated) DEVICE — PK MINOR SPLT 10

## (undated) DEVICE — GAUZE FLUFF 1 PLY: Brand: DEROYAL

## (undated) DEVICE — ANTIBACTERIAL UNDYED BRAIDED (POLYGLACTIN 910), SYNTHETIC ABSORBABLE SUTURE: Brand: COATED VICRYL

## (undated) DEVICE — GLV SURG BIOGEL LTX PF 7 1/2

## (undated) DEVICE — SEALANT WND FIBRIN TISSEEL VAPOR/HEAT/PREFIL/SYR 10ML

## (undated) DEVICE — TRAP FLD MINIVAC MEGADYNE 100ML

## (undated) DEVICE — PREMIUM DRY TRAY LF: Brand: MEDLINE INDUSTRIES, INC.

## (undated) DEVICE — SPNG GZ WOVN 4X4IN 12PLY 10/BX STRL

## (undated) DEVICE — SUT GUT CHRM 0 STD TIES 54IN S114H